# Patient Record
Sex: MALE | Race: WHITE | NOT HISPANIC OR LATINO | Employment: OTHER | URBAN - METROPOLITAN AREA
[De-identification: names, ages, dates, MRNs, and addresses within clinical notes are randomized per-mention and may not be internally consistent; named-entity substitution may affect disease eponyms.]

---

## 2018-01-11 ENCOUNTER — TELEPHONE (OUTPATIENT)
Dept: OTHER | Facility: CLINIC | Age: 68
End: 2018-01-11

## 2018-01-11 NOTE — LETTER
[facility]  [address] [place] [address] [address]  [place] [address]  Phone: [phone]  Fax: [phone]      January 11, 2018      Sulaiman Marroquin  [address] [facility] [address]          Dear Sulaiman Marroquin,    It is our policy after two unacknowledged letters that we dismiss you from our service.  Our records indicate that you were sent letters 9/28/17 and 10/31/17.  Please let us know if you have found another provider so that we can forward your records.    Sincerely,      Jos Patel MD    [facility]  [address]  [facility] [address]  Phone: [phone]  Fax: [phone]

## 2018-01-11 NOTE — TELEPHONE ENCOUNTER
Patient had not responded to two letters sent for follow up  Dismissal letter sent  Malaika Bess RN, BSN

## 2018-11-02 ENCOUNTER — OFFICE VISIT (OUTPATIENT)
Dept: FAMILY MEDICINE | Facility: CLINIC | Age: 68
End: 2018-11-02
Payer: COMMERCIAL

## 2018-11-02 VITALS
BODY MASS INDEX: 27.3 KG/M2 | RESPIRATION RATE: 14 BRPM | HEART RATE: 66 BPM | WEIGHT: 206 LBS | TEMPERATURE: 98.1 F | DIASTOLIC BLOOD PRESSURE: 80 MMHG | HEIGHT: 73 IN | SYSTOLIC BLOOD PRESSURE: 132 MMHG | OXYGEN SATURATION: 97 %

## 2018-11-02 DIAGNOSIS — J45.20 MILD INTERMITTENT ASTHMA WITHOUT COMPLICATION: ICD-10-CM

## 2018-11-02 DIAGNOSIS — Z23 NEED FOR PROPHYLACTIC VACCINATION WITH TETANUS-DIPHTHERIA (TD): ICD-10-CM

## 2018-11-02 DIAGNOSIS — Z23 NEED FOR PROPHYLACTIC VACCINATION AGAINST STREPTOCOCCUS PNEUMONIAE (PNEUMOCOCCUS): ICD-10-CM

## 2018-11-02 DIAGNOSIS — Z11.59 NEED FOR HEPATITIS C SCREENING TEST: ICD-10-CM

## 2018-11-02 DIAGNOSIS — Z00.00 INITIAL MEDICARE ANNUAL WELLNESS VISIT: Primary | ICD-10-CM

## 2018-11-02 DIAGNOSIS — Z12.11 SCREEN FOR COLON CANCER: ICD-10-CM

## 2018-11-02 DIAGNOSIS — Z12.5 SCREENING FOR PROSTATE CANCER: ICD-10-CM

## 2018-11-02 DIAGNOSIS — Z23 NEED FOR PROPHYLACTIC VACCINATION AND INOCULATION AGAINST INFLUENZA: ICD-10-CM

## 2018-11-02 DIAGNOSIS — Z13.220 LIPID SCREENING: ICD-10-CM

## 2018-11-02 PROCEDURE — G0008 ADMIN INFLUENZA VIRUS VAC: HCPCS | Performed by: FAMILY MEDICINE

## 2018-11-02 PROCEDURE — 90662 IIV NO PRSV INCREASED AG IM: CPT | Performed by: FAMILY MEDICINE

## 2018-11-02 PROCEDURE — 90670 PCV13 VACCINE IM: CPT | Performed by: FAMILY MEDICINE

## 2018-11-02 PROCEDURE — G0438 PPPS, INITIAL VISIT: HCPCS | Mod: 25 | Performed by: FAMILY MEDICINE

## 2018-11-02 PROCEDURE — 90472 IMMUNIZATION ADMIN EACH ADD: CPT | Performed by: FAMILY MEDICINE

## 2018-11-02 PROCEDURE — G0009 ADMIN PNEUMOCOCCAL VACCINE: HCPCS | Mod: 59 | Performed by: FAMILY MEDICINE

## 2018-11-02 PROCEDURE — 90715 TDAP VACCINE 7 YRS/> IM: CPT | Performed by: FAMILY MEDICINE

## 2018-11-02 RX ORDER — ALBUTEROL SULFATE 90 UG/1
2 AEROSOL, METERED RESPIRATORY (INHALATION) EVERY 6 HOURS PRN
Qty: 1 INHALER | Refills: 0 | Status: SHIPPED | OUTPATIENT
Start: 2018-11-02 | End: 2020-07-21

## 2018-11-02 RX ORDER — CETIRIZINE HYDROCHLORIDE 10 MG/1
10 TABLET ORAL DAILY PRN
COMMUNITY
End: 2019-01-31

## 2018-11-02 ASSESSMENT — ENCOUNTER SYMPTOMS
PALPITATIONS: 0
DIZZINESS: 0
DYSURIA: 0
ENDOCRINE NEGATIVE: 1
HEADACHES: 0
HEMATOCHEZIA: 0
WHEEZING: 1
CONSTIPATION: 0
ARTHRALGIAS: 0
JOINT SWELLING: 0
COUGH: 1
ABDOMINAL PAIN: 0
HEARTBURN: 0
FEVER: 0
NAUSEA: 0
CHILLS: 0
SORE THROAT: 0
MYALGIAS: 1
PARESTHESIAS: 0
HEMATOLOGIC/LYMPHATIC NEGATIVE: 1
NERVOUS/ANXIOUS: 1
EYE PAIN: 0
SHORTNESS OF BREATH: 1
HEMATURIA: 0
FREQUENCY: 0
DIARRHEA: 0
WEAKNESS: 0

## 2018-11-02 ASSESSMENT — ACTIVITIES OF DAILY LIVING (ADL): CURRENT_FUNCTION: NO ASSISTANCE NEEDED

## 2018-11-02 NOTE — MR AVS SNAPSHOT
After Visit Summary   11/2/2018    Sulaiman Marroquin    MRN: [id number]           Patient Information     Date Of Birth          1950        Visit Information        Provider Department      11/2/2018 9:00 AM Hayden Rosario MD [facility]        Today's Diagnoses     Mild intermittent asthma without complication    -  1    Screen for colon cancer        Need for hepatitis C screening test        Need for prophylactic vaccination and inoculation against influenza        Need for prophylactic vaccination against Streptococcus pneumoniae (pneumococcus)        Need for prophylactic vaccination with tetanus-diphtheria (Td)        Lipid screening        Screening for prostate cancer          Care Instructions      Preventive Health Recommendations:     See your health care provider every year to    Review health changes.     Discuss preventive care.      Review your medicines if your doctor has prescribed any.      Talk with your health care provider about whether you should have a test to screen for prostate cancer (PSA).    Every 3 years, have a diabetes test (fasting glucose). If you are at risk for diabetes, you should have this test more often.    Every 5 years, have a cholesterol test. Have this test more often if you are at risk for high cholesterol or heart disease.     Every 10 years, have a colonoscopy. Or, have a yearly FIT test (stool test). These exams will check for colon cancer.    Talk to with your health care provider about screening for Abdominal Aortic Aneurysm if you have a family history of AAA or have a history of smoking.    Shots:     Get a flu shot each year.     Get a tetanus shot every 10 years.     Talk to your doctor about your pneumonia vaccines. There are now two you should receive - Pneumovax (PPSV 23) and Prevnar (PCV 13).     Talk to your pharmacist about a shingles vaccine.     Talk to your doctor about the hepatitis B  vaccine.  Nutrition:     Eat at least 5 servings of fruits and vegetables each day.     Eat whole-grain bread, whole-wheat pasta and brown rice instead of white grains and rice.     Get adequate Calcium and Vitamin D.   Lifestyle    Exercise for at least 150 minutes a week (30 minutes a day, 5 days a week). This will help you control your weight and prevent disease.     Limit alcohol to one drink per day.     No smoking.     Wear sunscreen to prevent skin cancer.    See your dentist every six months for an exam and cleaning.    See your eye doctor every 1 to 2 years to screen for conditions such as glaucoma, macular degeneration, cataracts, etc.    Personalized Prevention Plan  You are due for the preventive services outlined below.  Your care team is available to assist you in scheduling these services.  If you have already completed any of these items, please share that information with your care team to update in your medical record.  Health Maintenance Due   Topic Date Due     Tetanus Vaccine - every 10 years  01/22/1968     Hepatitis C Screening  01/22/1968     Cholesterol Lab - every 5 years  01/22/1985     Colon Cancer Screening - every 10 years.  01/22/2000     Pneumococcal Vaccine (1 of 2 - PCV13) 01/22/2015     Flu Vaccine (1) 09/01/2018     I recommend shingrix vaccine - medicare requires this be given at the pharmacy.    The goals you identified this year include walking more regularly and cutting back on sugars, perhaps also working towards the mediterranean diet. Specifically introducing more fresh fruits.    Clotrimazole to feet daily for athlete's foot.          Follow-ups after your visit        Future tests that were ordered for you today     Open Future Orders        Priority Expected Expires Ordered    Hepatitis C Screen Reflex to HCV RNA Quant and Genotype Routine  11/2/2019 11/2/2018    Lipid panel reflex to direct LDL Fasting Routine  11/2/2019 11/2/2018    PSA, screen Routine  11/2/2019  "11/2/2018            Who to contact     If you have questions or need follow up information about today's clinic visit or your schedule please contact [facility] directly at [phone].  Normal or non-critical lab and imaging results will be communicated to you by MyChart, letter or phone within 4 business days after the clinic has received the results. If you do not hear from us within 7 days, please contact the clinic through MyChart or phone. If you have a critical or abnormal lab result, we will notify you by phone as soon as possible.  Submit refill requests through Fios or call your pharmacy and they will forward the refill request to us. Please allow 3 business days for your refill to be completed.          Additional Information About Your Visit        [employer] Information     Fios gives you secure access to your electronic health record. If you see a primary care provider, you can also send messages to your care team and make appointments. If you have questions, please call your primary care clinic.  If you do not have a primary care provider, please call [phone] and they will assist you.        Care EveryWhere ID     This is your Care EveryWhere ID. This could be used by other organizations to access your [place] medical records  [id number]        Your Vitals Were     Pulse Temperature Respirations Height Pulse Oximetry BMI (Body Mass Index)    66 98.1  F (36.7  C) (Oral) 14 6' 0.5\" (1.842 m) 97% 27.55 kg/m2       Blood Pressure from Last 3 Encounters:   11/02/18 132/80   11/04/16 118/64   12/30/15 128/78    Weight from Last 3 Encounters:   11/02/18 206 lb (93.4 kg)   11/04/16 215 lb 9.6 oz (97.8 kg)   12/30/15 210 lb (95.3 kg)              We Performed the Following     FLU VACCINE, INCREASED ANTIGEN, PRESV FREE, AGE 65+ [71197]     PNEUMOCOCCAL CONJ VACCINE 13 VALENT IM     TDAP, IM (10 - 64 YRS) - Adacel     Vaccine Administration, Each Additional [40557]     Vaccine " Administration, Initial [54052]          Today's Medication Changes          These changes are accurate as of 11/2/18 11:15 AM.  If you have any questions, ask your nurse or doctor.               Start taking these medicines.        Dose/Directions    albuterol 108 (90 Base) MCG/ACT inhaler   Commonly known as:  PROAIR HFA/PROVENTIL HFA/VENTOLIN HFA   Used for:  Mild intermittent asthma without complication   Started by:  Hayden Rosario MD        Dose:  2 puff   Inhale 2 puffs into the lungs every 6 hours as needed for shortness of breath / dyspnea or wheezing   Quantity:  1 Inhaler   Refills:  0            Where to get your medicines      These medications were sent to [facility] [address] [place] [address] AT [address] [address]     Phone:  [phone]     albuterol 108 (90 Base) MCG/ACT inhaler                Primary Care Provider Fax #    Provider Not In System [phone]                Equal Access to Services     [facility]: Hadii velia ku hadasho Soomaali, waaxda luqadaha, qaybta kaalmada adeegyada, waxsaida hanin haytena ramirez . So [facility] [phone].    ATENCIÓN: Si habla español, tiene a joe disposición servicios gratuitos de asistencia lingüística. Llame al [phone].    We comply with applicable federal civil rights laws and [place] laws. We do not discriminate on the basis of race, color, national origin, age, disability, sex, sexual orientation, or gender identity.            Thank you!     Thank you for choosing [facility]  for your care. Our goal is always to provide you with excellent care. Hearing back from our patients is one way we can continue to improve our services. Please take a few minutes to complete the written survey that you may receive in the mail after your visit with us. Thank you!             Your Updated Medication List - Protect others around you:  Learn how to safely use, store and throw away your medicines at www.disposemymeds.org.          This list is accurate as of 11/2/18 11:15 AM.  Always use your most recent med list.                   Brand Name Dispense Instructions for use Diagnosis    albuterol 108 (90 Base) MCG/ACT inhaler    PROAIR HFA/PROVENTIL HFA/VENTOLIN HFA    1 Inhaler    Inhale 2 puffs into the lungs every 6 hours as needed for shortness of breath / dyspnea or wheezing    Mild intermittent asthma without complication       CALCIUM 500 PO      Take 1,000 mg by mouth daily        CENTRUM SILVER 50+MEN PO      Take by mouth daily        cetirizine 10 MG tablet    zyrTEC     Take 10 mg by mouth daily as needed for allergies        MELATONIN PO      Take 3 mg by mouth nightly as needed        PROBIOTIC ACIDOPHILUS PO           TURMERIC PO      Take 1,330 mg by mouth daily        URINOZINC PO      Take by mouth daily        VITAMIN C PO      Take 600 mg by mouth daily

## 2018-11-02 NOTE — PATIENT INSTRUCTIONS
Preventive Health Recommendations:     See your health care provider every year to    Review health changes.     Discuss preventive care.      Review your medicines if your doctor has prescribed any.      Talk with your health care provider about whether you should have a test to screen for prostate cancer (PSA).    Every 3 years, have a diabetes test (fasting glucose). If you are at risk for diabetes, you should have this test more often.    Every 5 years, have a cholesterol test. Have this test more often if you are at risk for high cholesterol or heart disease.     Every 10 years, have a colonoscopy. Or, have a yearly FIT test (stool test). These exams will check for colon cancer.    Talk to with your health care provider about screening for Abdominal Aortic Aneurysm if you have a family history of AAA or have a history of smoking.    Shots:     Get a flu shot each year.     Get a tetanus shot every 10 years.     Talk to your doctor about your pneumonia vaccines. There are now two you should receive - Pneumovax (PPSV 23) and Prevnar (PCV 13).     Talk to your pharmacist about a shingles vaccine.     Talk to your doctor about the hepatitis B vaccine.  Nutrition:     Eat at least 5 servings of fruits and vegetables each day.     Eat whole-grain bread, whole-wheat pasta and brown rice instead of white grains and rice.     Get adequate Calcium and Vitamin D.   Lifestyle    Exercise for at least 150 minutes a week (30 minutes a day, 5 days a week). This will help you control your weight and prevent disease.     Limit alcohol to one drink per day.     No smoking.     Wear sunscreen to prevent skin cancer.    See your dentist every six months for an exam and cleaning.    See your eye doctor every 1 to 2 years to screen for conditions such as glaucoma, macular degeneration, cataracts, etc.    Personalized Prevention Plan  You are due for the preventive services outlined below.  Your care team is available to assist you  in scheduling these services.  If you have already completed any of these items, please share that information with your care team to update in your medical record.  Health Maintenance Due   Topic Date Due     Tetanus Vaccine - every 10 years  01/22/1968     Hepatitis C Screening  01/22/1968     Cholesterol Lab - every 5 years  01/22/1985     Colon Cancer Screening - every 10 years.  01/22/2000     Pneumococcal Vaccine (1 of 2 - PCV13) 01/22/2015     Flu Vaccine (1) 09/01/2018     I recommend shingrix vaccine - medicare requires this be given at the pharmacy.    The goals you identified this year include walking more regularly and cutting back on sugars, perhaps also working towards the mediterranean diet. Specifically introducing more fresh fruits.    Clotrimazole to feet daily for athlete's foot.

## 2018-11-02 NOTE — PROGRESS NOTES
"SUBJECTIVE:   Sulaiman Marroquin is a 68 year old male who presents for Preventive Visit.    Are you in the first 12 months of your Medicare coverage?  No    Annual Wellness Visit     In general, how would you rate your overall health?  Excellent    Frequency of exercise:  None    Do you usually eat at least 4 servings of fruit and vegetables a day, include whole grains    & fiber and avoid regularly eating high fat or \"junk\" foods?  Yes    Taking medications regularly:  Yes (Would like to receive inhaler)    Medication side effects:  Not applicable    Ability to successfully perform activities of daily living:  No assistance needed    Home Safety:  Throw rugs in the hallway and lack of grab bars in the bathroom    Hearing Impairment:  No hearing concerns    In the past 6 months, have you been bothered by leaking of urine?  No    In general, how would you rate your overall mental or emotional health?  Good    PHQ-2 Total Score: 0    Additional concerns today:  Yes    Updated on aneurysm history. Patient would like rescue inhaler - hasn't needed it.    Physical Activity: would be good to be more active, perhaps walking more regularly  Nutrition: trying to cut back or eliminate sugar.     Fall risk:  Fallen 2 or more times in the past year?: No  Any fall with injury in the past year?: No    COGNITIVE SCREEN  1) Repeat 3 items (Leader, Season, Table)    2) Clock draw: NORMAL  3) 3 item recall: Recalls 3 objects  Results: 3 items recalled: COGNITIVE IMPAIRMENT LESS LIKELY    Mini-CogTM Copyright S Nora. Licensed by the author for use in [facility]; reprinted with permission (nelson@.[facility]). All rights reserved.        Reviewed and updated as needed this visit by clinical staff  Tobacco  Allergies  Meds  Problems  Med Hx  Surg Hx  Fam Hx  Soc Hx          Reviewed and updated as needed this visit by Provider  Allergies  Meds  Problems        Social History   Substance Use Topics     Smoking " status: Never Smoker     Smokeless tobacco: Never Used     Alcohol use No      Comment: Recovery        Alcohol Use 11/2/2018   If you drink alcohol do you typically have greater than 3 drinks per day OR greater than 7 drinks per week? Not Applicable       Do you feel safe in your environment - Yes    Do you have a Health Care Directive?: No: Advance care planning was reviewed with patient; patient declined at this time.    Current providers sharing in care for this patient include:   Patient Care Team:  System, Provider Not In as PCP - General (Clinic)    The following health maintenance items are reviewed in Epic and correct as of today:  Health Maintenance   Topic Date Due     ASTHMA ACTION PLAN Q1 YR  01/22/1955     ASTHMA CONTROL TEST Q6 MOS  01/22/1955     TETANUS IMMUNIZATION (SYSTEM ASSIGNED)  01/22/1968     HEPATITIS C SCREENING  01/22/1968     LIPID SCREEN Q5 YR MALE (SYSTEM ASSIGNED)  01/22/1985     COLON CANCER SCREEN (SYSTEM ASSIGNED)  01/22/2000     PNEUMOCOCCAL (1 of 2 - PCV13) 01/22/2015     INFLUENZA VACCINE (1) 09/01/2018     FALL RISK ASSESSMENT  11/02/2019     PHQ-2 Q1 YR  11/02/2019     ADVANCE DIRECTIVE PLANNING Q5 YRS  11/02/2023     AORTIC ANEURYSM SCREENING (SYSTEM ASSIGNED)  Completed       Review of Systems   Constitutional: Negative for chills and fever.   HENT: Positive for congestion. Negative for ear pain, hearing loss and sore throat.    Eyes: Negative for pain and visual disturbance.   Respiratory: Positive for cough, shortness of breath and wheezing.         Asthma   Cardiovascular: Negative for chest pain, palpitations and peripheral edema.   Gastrointestinal: Negative for abdominal pain, constipation, diarrhea, heartburn, hematochezia and nausea.   Endocrine: Negative.    Genitourinary: Negative for discharge, dysuria, frequency, genital sores, hematuria, impotence and urgency.   Musculoskeletal: Positive for myalgias. Negative for arthralgias and joint swelling.   Skin: Negative  "for rash.   Allergic/Immunologic: Positive for environmental allergies.   Neurological: Negative for dizziness, weakness, headaches and paresthesias.   Hematological: Negative.    Psychiatric/Behavioral: Negative for mood changes. The patient is nervous/anxious.      Muscle aches relate to lower back pain - sees chiropractor    Does have stress -  had a stroke about a year ago. Has some expressive aphasia. Does see a counselor.     OBJECTIVE:   /80 (BP Location: Left arm, Patient Position: Sitting, Cuff Size: Adult Regular)  Pulse 66  Temp 98.1  F (36.7  C) (Oral)  Resp 14  Ht 6' 0.5\" (1.842 m)  Wt 206 lb (93.4 kg)  SpO2 97%  BMI 27.55 kg/m2 Estimated body mass index is 27.55 kg/(m^2) as calculated from the following:    Height as of this encounter: 6' 0.5\" (1.842 m).    Weight as of this encounter: 206 lb (93.4 kg).  Physical Exam  GENERAL: healthy, alert and no distress  EYES: Eyes grossly normal to inspection, PERRL and conjunctivae and sclerae normal  HENT: ear canals and TM's normal, nose and mouth without ulcers or lesions  NECK: no adenopathy, no asymmetry, masses, or scars and thyroid normal to palpation  RESP: lungs clear to auscultation - no rales, rhonchi or wheezes  CV: regular rate and rhythm, normal S1 S2, no S3 or S4, no murmur, click or rub, no peripheral edema and peripheral pulses strong  ABDOMEN: soft, nontender, no hepatosplenomegaly, no masses and bowel sounds normal  MS: no gross musculoskeletal defects noted, no edema  SKIN: no suspicious lesions or rashes  NEURO: Normal strength and tone, mentation intact and speech normal  PSYCH: mentation appears normal, affect normal/bright      ASSESSMENT / PLAN:   Sulaiman was seen today for physical.    Diagnoses and all orders for this visit:    Initial Medicare annual wellness visit    Screen for colon cancer    Need for hepatitis C screening test  -     Hepatitis C Screen Reflex to HCV RNA Quant and Genotype; Future    Need for " "prophylactic vaccination and inoculation against influenza  -     FLU VACCINE, INCREASED ANTIGEN, PRESV FREE, AGE 65+ [69638]  -     Vaccine Administration, Initial [52560]    Need for prophylactic vaccination against Streptococcus pneumoniae (pneumococcus)  -     PNEUMOCOCCAL CONJ VACCINE 13 VALENT IM  -     Vaccine Administration, Each Additional [59407]    Need for prophylactic vaccination with tetanus-diphtheria (Td)  -     TDAP, IM (10 - 64 YRS) - Adacel  -     Vaccine Administration, Each Additional [15977]    Mild intermittent asthma without complication  -     albuterol (PROAIR HFA/PROVENTIL HFA/VENTOLIN HFA) 108 (90 Base) MCG/ACT inhaler; Inhale 2 puffs into the lungs every 6 hours as needed for shortness of breath / dyspnea or wheezing    Lipid screening  -     Lipid panel reflex to direct LDL Fasting; Future    Screening for prostate cancer  -     PSA, screen; Future    Other orders  -     Cancel: GLUCOSE        End of Life Planning:  Patient currently has an advanced directive: to be discussed at upcoming visit    COUNSELING:  Reviewed preventive health counseling, as reflected in patient instructions       Regular exercise       Healthy diet/nutrition    BP Readings from Last 1 Encounters:   11/02/18 132/80     Estimated body mass index is 27.55 kg/(m^2) as calculated from the following:    Height as of this encounter: 6' 0.5\" (1.842 m).    Weight as of this encounter: 206 lb (93.4 kg).    I recommend shingrix vaccine - medicare requires this be given at the pharmacy.    The goals you identified this year include walking more regularly and cutting back on sugars, perhaps also working towards the mediterranean diet. Specifically introducing more fresh fruits.    Clotrimazole to feet daily for athlete's foot.       reports that he has never smoked. He has never used smokeless tobacco.      Appropriate preventive services were discussed with this patient, including applicable screening as appropriate for " cardiovascular disease, diabetes, osteopenia/osteoporosis, and glaucoma.  As appropriate for age/gender, discussed screening for colorectal cancer, prostate cancer, breast cancer, and cervical cancer. Checklist reviewing preventive services available has been given to the patient.    Reviewed patients plan of care and provided an AVS. The Intermediate Care Plan ( asthma action plan, low back pain action plan, and migraine action plan) for Sulaiman meets the Care Plan requirement. This Care Plan has been established and reviewed with the Patient.    Counseling Resources:  ATP IV Guidelines  Pooled Cohorts Equation Calculator  Breast Cancer Risk Calculator  FRAX Risk Assessment  ICSI Preventive Guidelines  Dietary Guidelines for Americans, 2010  USDA's MyPlate  ASA Prophylaxis  Lung CA Screening    Hayden Maloney MD  [facility]

## 2018-11-02 NOTE — NURSING NOTE
Prior to injection verified patient identity using patient's name and date of birth.  Due to injection administration, patient instructed to remain in clinic for 15 minutes  afterwards, and to report any adverse reaction to me immediately. Vaccine information supplied.      Injectable Influenza Immunization Documentation    1.  Is the person to be vaccinated sick today?   No    2. Does the person to be vaccinated have an allergy to a component   of the vaccine?   No  Egg Allergy Algorithm Link    3. Has the person to be vaccinated ever had a serious reaction   to influenza vaccine in the past?   No    4. Has the person to be vaccinated ever had Guillain-Barré syndrome?   No    Form completed by [facility]    Screening Questionnaire for Adult Immunization    Are you sick today?   No   Do you have allergies to medications, food, a vaccine component or latex?   Yes   Have you ever had a serious reaction after receiving a vaccination?   No   Do you have a long-term health problem with heart disease, lung disease, asthma, kidney disease, metabolic disease (e.g. diabetes), anemia, or other blood disorder?   No   Do you have cancer, leukemia, HIV/AIDS, or any other immune system problem?   No   In the past 3 months, have you taken medications that affect  your immune system, such as prednisone, other steroids, or anticancer drugs; drugs for the treatment of rheumatoid arthritis, Crohn s disease, or psoriasis; or have you had radiation treatments?   No   Have you had a seizure, or a brain or other nervous system problem?   No   During the past year, have you received a transfusion of blood or blood     products, or been given immune (gamma) globulin or antiviral drug?   No   For women: Are you pregnant or is there a chance you could become        pregnant during the next month?   No   Have you received any vaccinations in the past 4 weeks?   No     Immunization questionnaire was positive for at least one answer.  Notified  Berhane Maloney.        Per orders of Dr. Berhane Aparicio, injection of Adacel and PCV13 given by Mauro Vicente. Patient instructed to remain in clinic for 15 minutes afterwards, and to report any adverse reaction to me immediately.       Screening performed by Mauro Vicente on 11/2/2018 at 11:14 AM.

## 2018-11-03 ASSESSMENT — ASTHMA QUESTIONNAIRES: ACT_TOTALSCORE: 16

## 2019-01-31 ENCOUNTER — OFFICE VISIT (OUTPATIENT)
Dept: FAMILY MEDICINE | Facility: CLINIC | Age: 69
End: 2019-01-31
Payer: COMMERCIAL

## 2019-01-31 VITALS
HEART RATE: 70 BPM | SYSTOLIC BLOOD PRESSURE: 149 MMHG | HEIGHT: 73 IN | OXYGEN SATURATION: 96 % | WEIGHT: 213 LBS | RESPIRATION RATE: 20 BRPM | DIASTOLIC BLOOD PRESSURE: 78 MMHG | BODY MASS INDEX: 28.23 KG/M2 | TEMPERATURE: 98 F

## 2019-01-31 DIAGNOSIS — Z12.5 SCREENING FOR PROSTATE CANCER: ICD-10-CM

## 2019-01-31 DIAGNOSIS — Z11.59 NEED FOR HEPATITIS C SCREENING TEST: ICD-10-CM

## 2019-01-31 DIAGNOSIS — Z13.220 LIPID SCREENING: ICD-10-CM

## 2019-01-31 DIAGNOSIS — M25.561 ACUTE PAIN OF RIGHT KNEE: Primary | ICD-10-CM

## 2019-01-31 LAB
ANION GAP SERPL CALCULATED.3IONS-SCNC: 4 MMOL/L (ref 3–14)
BASOPHILS # BLD AUTO: 0 10E9/L (ref 0–0.2)
BASOPHILS NFR BLD AUTO: 0.3 %
BUN SERPL-MCNC: 16 MG/DL (ref 7–30)
CALCIUM SERPL-MCNC: 9.3 MG/DL (ref 8.5–10.1)
CHLORIDE SERPL-SCNC: 103 MMOL/L (ref 94–109)
CHOLEST SERPL-MCNC: 181 MG/DL
CO2 SERPL-SCNC: 29 MMOL/L (ref 20–32)
CREAT SERPL-MCNC: 1.05 MG/DL (ref 0.66–1.25)
DIFFERENTIAL METHOD BLD: ABNORMAL
EOSINOPHIL # BLD AUTO: 0.4 10E9/L (ref 0–0.7)
EOSINOPHIL NFR BLD AUTO: 5.7 %
ERYTHROCYTE [DISTWIDTH] IN BLOOD BY AUTOMATED COUNT: 11.3 % (ref 10–15)
GFR SERPL CREATININE-BSD FRML MDRD: 72 ML/MIN/{1.73_M2}
GLUCOSE SERPL-MCNC: 90 MG/DL (ref 70–99)
HCT VFR BLD AUTO: 42.9 % (ref 40–53)
HDLC SERPL-MCNC: 40 MG/DL
HGB BLD-MCNC: 14.8 G/DL (ref 13.3–17.7)
LDLC SERPL CALC-MCNC: 101 MG/DL
LYMPHOCYTES # BLD AUTO: 2.4 10E9/L (ref 0.8–5.3)
LYMPHOCYTES NFR BLD AUTO: 37.6 %
MCH RBC QN AUTO: 33.6 PG (ref 26.5–33)
MCHC RBC AUTO-ENTMCNC: 34.5 G/DL (ref 31.5–36.5)
MCV RBC AUTO: 98 FL (ref 78–100)
MONOCYTES # BLD AUTO: 0.8 10E9/L (ref 0–1.3)
MONOCYTES NFR BLD AUTO: 12.7 %
NEUTROPHILS # BLD AUTO: 2.8 10E9/L (ref 1.6–8.3)
NEUTROPHILS NFR BLD AUTO: 43.7 %
NONHDLC SERPL-MCNC: 141 MG/DL
PLATELET # BLD AUTO: 211 10E9/L (ref 150–450)
POTASSIUM SERPL-SCNC: 4.1 MMOL/L (ref 3.4–5.3)
PSA SERPL-ACNC: 0.31 UG/L (ref 0–4)
RBC # BLD AUTO: 4.4 10E12/L (ref 4.4–5.9)
SODIUM SERPL-SCNC: 136 MMOL/L (ref 133–144)
TRIGL SERPL-MCNC: 198 MG/DL
URATE SERPL-MCNC: 5.8 MG/DL (ref 3.5–7.2)
WBC # BLD AUTO: 6.3 10E9/L (ref 4–11)

## 2019-01-31 PROCEDURE — 36415 COLL VENOUS BLD VENIPUNCTURE: CPT | Performed by: FAMILY MEDICINE

## 2019-01-31 PROCEDURE — 86803 HEPATITIS C AB TEST: CPT | Performed by: FAMILY MEDICINE

## 2019-01-31 PROCEDURE — 84550 ASSAY OF BLOOD/URIC ACID: CPT | Performed by: FAMILY MEDICINE

## 2019-01-31 PROCEDURE — G0472 HEP C SCREEN HIGH RISK/OTHER: HCPCS | Performed by: FAMILY MEDICINE

## 2019-01-31 PROCEDURE — 80061 LIPID PANEL: CPT | Performed by: FAMILY MEDICINE

## 2019-01-31 PROCEDURE — G0103 PSA SCREENING: HCPCS | Performed by: FAMILY MEDICINE

## 2019-01-31 PROCEDURE — 85025 COMPLETE CBC W/AUTO DIFF WBC: CPT | Performed by: FAMILY MEDICINE

## 2019-01-31 PROCEDURE — 80048 BASIC METABOLIC PNL TOTAL CA: CPT | Performed by: FAMILY MEDICINE

## 2019-01-31 PROCEDURE — 99214 OFFICE O/P EST MOD 30 MIN: CPT | Performed by: FAMILY MEDICINE

## 2019-01-31 RX ORDER — CYANOCOBALAMIN (VITAMIN B-12) 500 MCG
400 LOZENGE ORAL DAILY
COMMUNITY

## 2019-01-31 RX ORDER — INDOMETHACIN 25 MG/1
25 CAPSULE ORAL 2 TIMES DAILY WITH MEALS
Qty: 180 CAPSULE | Refills: 3 | Status: SHIPPED | OUTPATIENT
Start: 2019-01-31 | End: 2019-02-08

## 2019-01-31 ASSESSMENT — MIFFLIN-ST. JEOR: SCORE: 1777.1

## 2019-01-31 NOTE — PROGRESS NOTES
"  SUBJECTIVE:   Sulaiman Marroquin is a 69 year old male who presents to clinic today for the following health issues:      Musculoskeletal problem/pain      Duration: two weeks     Description  Location:  Right knee    Intensity:  moderate    Accompanying signs and symptoms: swelling    History  Previous similar problem: YES- injured in middle school   Previous evaluation:  Chiropractor     Precipitating or alleviating factors:  Trauma or overuse: unknown   Aggravating factors include: standing and climbing stairs    Therapies tried and outcome: ice tylenol and chiropractor      No recollection of an injury. He doesn't remember the exact moment but it twisted slightly when he was getting up or getting down.    Pain is 1/10 at present.  Two nights ago it was 4-5/10 and caused difficulty sleeping.    No alcohol consumption.  No change in protein consumption.    ROS  No fever  Mild cough  No rash    EXAM:  Pulse 70   Temp 98  F (36.7  C)   Resp 20   Ht 1.842 m (6' 0.5\")   Wt 96.6 kg (213 lb)   SpO2 96%   BMI 28.49 kg/m    Constitutional: Healthy, alert, no distress   EENT: Mole night in sclera of right eye, PERRL, TM's clear bilaterally, oropharynx without erythema, no anterior cervical lymphadenopathy   Cardiovascular: RRR. No murmurs   Respiratory: Clear to auscultation   Musculoskeletal: swelling and discomfort with flexion of right knee. No erythema or warmth appreciated.    ASSESSMENT    ICD-10-CM    1. Acute pain of right knee M25.561 Uric acid     Basic metabolic panel  (Ca, Cl, CO2, Creat, Gluc, K, Na, BUN)     indomethacin (INDOCIN) 25 MG capsule     CBC with platelets and differential      Plan:  Patient Instructions   Consider a routine eye exam.    I recommend a humidifier in the bedroom 30-40% humidity, change filter monthly.  Cold air humidifier not vaporizer.     I recommend indomethacin 25mg by mouth three times a day for up to 5 days.    Could consider knee brace, PT, knee xray and ortho " consult if not improving with treatment or labs not supportive of gout.    Working diagnosis is gout, though differential could include infectious or traumatic injury.       Return in about 4 weeks (around 2/28/2019) for Routine Visit.    Hayden Maloney MD  Family Medicine Physician

## 2019-01-31 NOTE — PATIENT INSTRUCTIONS
Consider a routine eye exam.    I recommend a humidifier in the bedroom 30-40% humidity, change filter monthly.  Cold air humidifier not vaporizer.     I recommend indomethacin 25mg by mouth three times a day for up to 5 days.    Could consider knee brace, PT, knee xray and ortho consult if not improving with treatment or labs not supportive of gout.    Working diagnosis is gout, though differential could include infectious or traumatic injury.

## 2019-02-01 LAB — HCV AB SERPL QL IA: NONREACTIVE

## 2019-02-04 ENCOUNTER — MYC MEDICAL ADVICE (OUTPATIENT)
Dept: FAMILY MEDICINE | Facility: CLINIC | Age: 69
End: 2019-02-04

## 2019-02-04 DIAGNOSIS — M25.561 RIGHT KNEE PAIN: Primary | ICD-10-CM

## 2019-02-04 NOTE — TELEPHONE ENCOUNTER
Mino Maloney MD     Patient sent another message requesting order for hip xray as well as the knee xray requested below     Please advise     Thank you,   Nesha Burger, Registered Nurse   [facility]

## 2019-02-04 NOTE — TELEPHONE ENCOUNTER
Dr Berhane Maloney,    THe referral to ortho was initiated for pt as you had indicated in your note that was the next step.     Can you order the knee xray if you think he should have it before his ortho visit?    Thanks,    Denisse Ruiz, RN, BSN

## 2019-02-06 NOTE — TELEPHONE ENCOUNTER
My recommendation is for him to have xrays done at a visit with me (or another provider).    I can order them at that time.    Hayden Maloney MD  Family Medicine Physician

## 2019-02-08 ENCOUNTER — OFFICE VISIT (OUTPATIENT)
Dept: FAMILY MEDICINE | Facility: CLINIC | Age: 69
End: 2019-02-08
Payer: COMMERCIAL

## 2019-02-08 VITALS
DIASTOLIC BLOOD PRESSURE: 88 MMHG | HEART RATE: 70 BPM | TEMPERATURE: 97.9 F | RESPIRATION RATE: 14 BRPM | SYSTOLIC BLOOD PRESSURE: 138 MMHG | WEIGHT: 212 LBS | BODY MASS INDEX: 28.36 KG/M2 | OXYGEN SATURATION: 98 %

## 2019-02-08 DIAGNOSIS — M25.561 ACUTE PAIN OF RIGHT KNEE: ICD-10-CM

## 2019-02-08 DIAGNOSIS — Z91.89 FRAMINGHAM CARDIAC RISK >20% IN NEXT 10 YEARS: ICD-10-CM

## 2019-02-08 DIAGNOSIS — M10.061 ACUTE IDIOPATHIC GOUT OF RIGHT KNEE: Primary | ICD-10-CM

## 2019-02-08 PROCEDURE — 99214 OFFICE O/P EST MOD 30 MIN: CPT | Performed by: FAMILY MEDICINE

## 2019-02-08 RX ORDER — INDOMETHACIN 25 MG/1
25 CAPSULE ORAL
Qty: 45 CAPSULE | Refills: 1 | Status: SHIPPED | OUTPATIENT
Start: 2019-02-08 | End: 2019-12-12

## 2019-02-08 RX ORDER — ATORVASTATIN CALCIUM 40 MG/1
40 TABLET, FILM COATED ORAL DAILY
Qty: 90 TABLET | Refills: 3 | Status: SHIPPED | OUTPATIENT
Start: 2019-02-08 | End: 2020-01-14

## 2019-02-08 NOTE — PROGRESS NOTES
SUBJECTIVE:   Sulaiman Marroquin is a 69 year old male who presents to clinic today for the following health issues:      Right knee follow up      Other: Patient stated stiffness, minor swelling and unreliability. Overall patient has seen minor relief.      Indomethacin did help. Uric acid level was normal.    He took this for 5 days starting a week ago - initially he took only two a day, when he increased to three times a day, it worked better.    He began to wonder about pain in his right hip since our last visit. He is having benefit from work with chiropractor.    The 10-year ASCVD risk score (Retsofward BONILLA Jr., et al., 2013) is: 20%    Values used to calculate the score:      Age: 69 years      Sex: Male      Is Non- : No      Diabetic: No      Tobacco smoker: No      Systolic Blood Pressure: 138 mmHg      Is BP treated: No      HDL Cholesterol: 40 mg/dL      Total Cholesterol: 181 mg/dL    ROS  No fever or chills  No other muscle aches than those noted above  Mild morning cough    EXAM:  /88   Pulse 70   Temp 97.9  F (36.6  C)   Resp 14   Wt 96.2 kg (212 lb)   SpO2 98%   BMI 28.36 kg/m    Constitutional: Healthy, alert, no distress   Cardiovascular: RRR. No murmurs   Respiratory: Clear to auscultation   Musculoskeletal: discomfort with palpation along right greater trochanter. Ambulates without difficulty. No swelling or tenderness of the right knee to palpation.     ASSESSMENT    ICD-10-CM    1. Acute idiopathic gout of right knee M10.061    2. [place] cardiac risk >20% in next 10 years Z91.89 atorvastatin (LIPITOR) 40 MG tablet   3. Acute pain of right knee M25.561 indomethacin (INDOCIN) 25 MG capsule      Plan:  Patient Instructions   I suspect this is gout based on your exam and response to indomethacin. It is not necessary for uric acid levels to be elevated for an episode of gout to occur.  If symptom returns in the future start indomethacin early in  symptoms.  Would be OK to use one more round of indomethacin next week if not resolved over the weekend. (3 times a day for 5 days)    I do recommend atorvastatin 40mg nightly, initially start with 20mg nightly for 2 weeks then 40mg.  We should recheck CMP in 1-3 months.    We should follow your BP as well in the coming months. Could consider addition of ACE inhibitor such as lisinopril if BP rises to be consistently above 140/90.     Continue work with chiropractor regarding right hip/greater trochanteric discomfort.    I do not recommend xrays today.    Return in about 3 months (around 5/8/2019) for Lab Work, BP Recheck, Routine Visit.    Hayden Maloney MD  Family Medicine Physician

## 2019-02-27 ENCOUNTER — ANCILLARY PROCEDURE (OUTPATIENT)
Dept: GENERAL RADIOLOGY | Facility: CLINIC | Age: 69
End: 2019-02-27
Attending: FAMILY MEDICINE
Payer: COMMERCIAL

## 2019-02-27 ENCOUNTER — OFFICE VISIT (OUTPATIENT)
Dept: FAMILY MEDICINE | Facility: CLINIC | Age: 69
End: 2019-02-27
Payer: COMMERCIAL

## 2019-02-27 VITALS
HEART RATE: 70 BPM | WEIGHT: 206.8 LBS | OXYGEN SATURATION: 95 % | HEIGHT: 73 IN | DIASTOLIC BLOOD PRESSURE: 62 MMHG | SYSTOLIC BLOOD PRESSURE: 124 MMHG | BODY MASS INDEX: 27.41 KG/M2 | TEMPERATURE: 97.8 F | RESPIRATION RATE: 22 BRPM

## 2019-02-27 DIAGNOSIS — M25.561 CHRONIC PAIN OF RIGHT KNEE: ICD-10-CM

## 2019-02-27 DIAGNOSIS — G89.29 CHRONIC PAIN OF RIGHT KNEE: ICD-10-CM

## 2019-02-27 DIAGNOSIS — M25.461 SWELLING OF RIGHT KNEE JOINT: Primary | ICD-10-CM

## 2019-02-27 DIAGNOSIS — Z91.89 RISK FOR CORONARY ARTERY DISEASE BETWEEN 10% AND 20% IN NEXT 10 YEARS PER FRAMINGHAM SCORE: ICD-10-CM

## 2019-02-27 PROCEDURE — 99214 OFFICE O/P EST MOD 30 MIN: CPT | Performed by: FAMILY MEDICINE

## 2019-02-27 PROCEDURE — 73562 X-RAY EXAM OF KNEE 3: CPT | Mod: RT

## 2019-02-27 ASSESSMENT — MIFFLIN-ST. JEOR: SCORE: 1748.98

## 2019-02-27 NOTE — PROGRESS NOTES
"  SUBJECTIVE:   Sulaiman Marroquin is a 69 year old male who presents to clinic today for the following health issues:    Gout of Right Knee Followup:    Date of visit: 02/08/2019  Current Status: Still swollen, occasional pain at night      Additional concerns: Recheck Medication Indomethacin and Atorvastatin. Pt states there was some ache and was wondering if there's another kind of medication similar to Atorvastatin    He stopped doing a regular dose of indomethacin. He notes persistent swelling of the right knee. There remains a little discomfort of the right knee at night.    He wonders about the effects of atorvastatin - he has had discomfort of other joints.    ROS  No fever  No chest pain  No knee instability, but feels unreliable    EXAM:  /62   Pulse 70   Temp 97.8  F (36.6  C) (Oral)   Resp 22   Ht 1.842 m (6' 0.5\")   Wt 93.8 kg (206 lb 12.8 oz)   SpO2 95%   BMI 27.66 kg/m    Constitutional: Healthy, alert, no distress   Musculoskeletal: right knee swelling, no erythema no warmth, no ankle swelling    ASSESSMENT    ICD-10-CM    1. Swelling of right knee joint M25.461 ORTHOPEDICS ADULT REFERRAL   2. Chronic pain of right knee M25.561 XR Knee Right 3 Views    G89.29 ORTHOPEDICS ADULT REFERRAL   3. Risk for coronary artery disease between 10% and 20% in next 10 years per [place] score Z91.89 Comprehensive metabolic panel (BMP + Alb, Alk Phos, ALT, AST, Total. Bili, TP)      Plan:  Patient Instructions   If sleeplessness persists as you believe it relates to atorvastatin 40mg, could consider lower dose of atorvastatin or 10mg of simvastatin. This is a preventive care approach and your main risk factor is not cholesterol, BP, or diabetes, but age alone.  I do not believe statins relate to your other joint pain, they are more noted for muscle aches when pain is a side effect.  We should check a CMP in the next 2-3 months. Return for a lab visit in early to mid April.    Given that your " exam was marginal in its support of the diagnosis of gout in the past, uric acid level is normal, and the response to indomethacin was marginal - I recommend further eval with xray and ortho consultation. Differential includes meniscal or arthritic origin of pain. PT or MRI might be indicated, but would defer to ortho.       Return in about 2 months (around 4/27/2019) for after visit with ortho.    Hayden Maloney MD  Family Medicine Physician

## 2019-02-27 NOTE — PATIENT INSTRUCTIONS
If sleeplessness persists as you believe it relates to atorvastatin 40mg, could consider lower dose of atorvastatin or 10mg of simvastatin. This is a preventive care approach and your main risk factor is not cholesterol, BP, or diabetes, but age alone.  I do not believe statins relate to your other joint pain, they are more noted for muscle aches when pain is a side effect.  We should check a CMP in the next 2-3 months. Return for a lab visit in early to mid April.    Given that your exam was marginal in its support of the diagnosis of gout in the past, uric acid level is normal, and the response to indomethacin was marginal - I recommend further eval with xray and ortho consultation. Differential includes meniscal or arthritic origin of pain. PT or MRI might be indicated, but would defer to ortho.

## 2019-11-02 ENCOUNTER — HEALTH MAINTENANCE LETTER (OUTPATIENT)
Age: 69
End: 2019-11-02

## 2019-11-22 ENCOUNTER — TRANSFERRED RECORDS (OUTPATIENT)
Dept: HEALTH INFORMATION MANAGEMENT | Facility: CLINIC | Age: 69
End: 2019-11-22

## 2019-11-22 LAB — HEMOCCULT STL QL IA: NEGATIVE

## 2019-12-05 ENCOUNTER — TRANSFERRED RECORDS (OUTPATIENT)
Dept: HEALTH INFORMATION MANAGEMENT | Facility: CLINIC | Age: 69
End: 2019-12-05

## 2019-12-12 ENCOUNTER — OFFICE VISIT (OUTPATIENT)
Dept: FAMILY MEDICINE | Facility: CLINIC | Age: 69
End: 2019-12-12
Payer: COMMERCIAL

## 2019-12-12 VITALS
HEART RATE: 72 BPM | TEMPERATURE: 97.9 F | DIASTOLIC BLOOD PRESSURE: 75 MMHG | OXYGEN SATURATION: 99 % | BODY MASS INDEX: 28.3 KG/M2 | HEIGHT: 73 IN | WEIGHT: 213.5 LBS | RESPIRATION RATE: 18 BRPM | SYSTOLIC BLOOD PRESSURE: 149 MMHG

## 2019-12-12 DIAGNOSIS — J30.2 SEASONAL ALLERGIES: Primary | ICD-10-CM

## 2019-12-12 DIAGNOSIS — E78.5 HYPERLIPIDEMIA LDL GOAL <100: ICD-10-CM

## 2019-12-12 DIAGNOSIS — J45.20 MILD INTERMITTENT ASTHMA WITHOUT COMPLICATION: ICD-10-CM

## 2019-12-12 DIAGNOSIS — I10 BENIGN ESSENTIAL HYPERTENSION: ICD-10-CM

## 2019-12-12 PROCEDURE — 99214 OFFICE O/P EST MOD 30 MIN: CPT | Performed by: FAMILY MEDICINE

## 2019-12-12 RX ORDER — LISINOPRIL 5 MG/1
5 TABLET ORAL DAILY
Qty: 30 TABLET | Refills: 1 | Status: SHIPPED | OUTPATIENT
Start: 2019-12-12 | End: 2020-01-14

## 2019-12-12 ASSESSMENT — MIFFLIN-ST. JEOR: SCORE: 1779.37

## 2019-12-12 NOTE — PROGRESS NOTES
Subjective     Sulaiman Marroquin is a 69 year old male who presents to clinic today for the following health issues:    HPI     Presents today to establish care.  Blood pressure has been running high-- would like to consider treatment.  No CP or HA or SOB.  Has been high at various appts.    Establish Care   Prior PCP Eden sue k in armpit and skin tag.    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  Semi-retired in [place].  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  Foster kid- 34-- in [place] (refugee from [place]).  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    There is no problem list on file for this patient.    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      Family History   Problem Relation Age of Onset     Alzheimer Disease Mother      Coronary Artery Disease Mother      Gastrointestinal Disease Father      Arrhythmia Father      Anxiety Disorder Father      Diabetes Paternal Grandmother      Breast Cancer Maternal Grandmother          Current Outpatient Medications   Medication Sig Dispense Refill     albuterol (PROAIR HFA/PROVENTIL HFA/VENTOLIN HFA) 108 (90 Base) MCG/ACT inhaler Inhale 2 puffs into the lungs every 6 hours as needed for shortness of breath / dyspnea or wheezing 1 Inhaler 0     atorvastatin (LIPITOR) 40 MG tablet Take 1 tablet (40 mg) by mouth daily 90 tablet 3     Fexofenadine HCl (WAL-FEX PO) Take by mouth daily        MELATONIN PO Take 3 mg by mouth nightly as needed       Ascorbic Acid (VITAMIN C PO) Take 600  "mg by mouth daily       Calcium-Magnesium-Vitamin D (CALCIUM 500 PO) Take 1,000 mg by mouth daily       Lactobacillus (PROBIOTIC ACIDOPHILUS PO)        Misc Natural Products (URINOZINC PO) Take by mouth daily       Multiple Vitamins-Minerals (CENTRUM SILVER 50+MEN PO) Take by mouth daily       TURMERIC PO Take 1,330 mg by mouth daily       vitamin E 400 units TABS Take 400 Units by mouth daily       Allergies   Allergen Reactions     Dogs      Dust Mites      Asa [Aspirin] Rash     Recent Labs   Lab Test 01/31/19  0948 10/26/15  2338   *  --    HDL 40  --    TRIG 198*  --    CR 1.05 1.12   GFRESTIMATED 72 66   GFRESTBLACK 84 79   POTASSIUM 4.1 3.9      BP Readings from Last 3 Encounters:   02/27/19 124/62   02/08/19 138/88   01/31/19 149/78    Wt Readings from Last 3 Encounters:   12/12/19 96.8 kg (213 lb 8 oz)   02/27/19 93.8 kg (206 lb 12.8 oz)   02/08/19 96.2 kg (212 lb)                      Reviewed and updated as needed this visit by Provider         Review of Systems   ROS COMP: Constitutional, HEENT, cardiovascular, pulmonary, GI, , musculoskeletal, neuro, skin, endocrine and psych systems are negative, except as otherwise noted.      Objective    Ht 1.842 m (6' 0.5\")   Wt 96.8 kg (213 lb 8 oz)   BMI 28.56 kg/m    Body mass index is 28.56 kg/m .  Physical Exam   GENERAL: healthy, alert and no distress  EYES: Eyes grossly normal to inspection, PERRL and conjunctivae and sclerae normal  NECK: no adenopathy, no asymmetry, masses, or scars and thyroid normal to palpation  RESP: lungs clear to auscultation - no rales, rhonchi or wheezes  CV: regular rate and rhythm, normal S1 S2, no S3 or S4, no murmur, click or rub, no peripheral edema and peripheral pulses strong  ABDOMEN: soft, nontender, no hepatosplenomegaly, no masses and bowel sounds normal  MS: no gross musculoskeletal defects noted, no edema  SKIN: seborrheic keratosis and skin tag noted at base of LEFT axilla  NEURO: Normal strength and tone, " mentation intact and speech normal  PSYCH: mentation appears normal, affect normal/bright            Assessment & Plan     1. Seasonal allergies    Continue with OTC anthistamines prn relief of symptoms.    2. Mild intermittent asthma without complication    Stable with albuterol use prn.    3. Hyperlipidemia LDL goal <100    On statin.  Lipids and CPE due next 1-2020.      4. Benign essential hypertension    - lisinopril (PRINIVIL/ZESTRIL) 5 MG tablet; Take 1 tablet (5 mg) by mouth daily  Dispense: 30 tablet; Refill: 1     Agreeable to starting lisinopril 5 mg once daily to bring blood pressure to goal < 130/80.  Recommend Follow-up in 2-4 weeks for CPE and bp check for further titration prn.    Thu Troncoso MD  [facility]

## 2019-12-13 ASSESSMENT — ASTHMA QUESTIONNAIRES: ACT_TOTALSCORE: 20

## 2020-01-10 ASSESSMENT — ENCOUNTER SYMPTOMS
CONSTIPATION: 0
WEAKNESS: 0
COUGH: 1
NAUSEA: 0
SORE THROAT: 0
FREQUENCY: 0
FEVER: 0
HEARTBURN: 0
NERVOUS/ANXIOUS: 0
DYSURIA: 0
HEMATOCHEZIA: 0
MYALGIAS: 0
HEADACHES: 0
JOINT SWELLING: 1
SHORTNESS OF BREATH: 0
PARESTHESIAS: 0
DIARRHEA: 0
ARTHRALGIAS: 0
DIZZINESS: 0
HEMATURIA: 0
PALPITATIONS: 0
ABDOMINAL PAIN: 0
CHILLS: 0
EYE PAIN: 0

## 2020-01-10 ASSESSMENT — ACTIVITIES OF DAILY LIVING (ADL): CURRENT_FUNCTION: NO ASSISTANCE NEEDED

## 2020-01-14 ENCOUNTER — OFFICE VISIT (OUTPATIENT)
Dept: FAMILY MEDICINE | Facility: CLINIC | Age: 70
End: 2020-01-14
Payer: COMMERCIAL

## 2020-01-14 VITALS
HEART RATE: 72 BPM | HEIGHT: 73 IN | SYSTOLIC BLOOD PRESSURE: 118 MMHG | WEIGHT: 212 LBS | DIASTOLIC BLOOD PRESSURE: 66 MMHG | RESPIRATION RATE: 16 BRPM | BODY MASS INDEX: 28.1 KG/M2 | TEMPERATURE: 96.6 F

## 2020-01-14 DIAGNOSIS — E78.5 HYPERLIPIDEMIA LDL GOAL <130: ICD-10-CM

## 2020-01-14 DIAGNOSIS — Z00.00 ENCOUNTER FOR MEDICARE ANNUAL WELLNESS EXAM: Primary | ICD-10-CM

## 2020-01-14 DIAGNOSIS — I10 BENIGN ESSENTIAL HYPERTENSION: ICD-10-CM

## 2020-01-14 DIAGNOSIS — I72.2 ANEURYSM OF RIGHT RENAL ARTERY (H): ICD-10-CM

## 2020-01-14 DIAGNOSIS — J45.30 MILD PERSISTENT ASTHMA WITHOUT COMPLICATION: ICD-10-CM

## 2020-01-14 LAB
ALBUMIN SERPL-MCNC: 3.7 G/DL (ref 3.4–5)
ALP SERPL-CCNC: 60 U/L (ref 40–150)
ALT SERPL W P-5'-P-CCNC: 47 U/L (ref 0–70)
ANION GAP SERPL CALCULATED.3IONS-SCNC: 6 MMOL/L (ref 3–14)
AST SERPL W P-5'-P-CCNC: 26 U/L (ref 0–45)
BILIRUB SERPL-MCNC: 0.6 MG/DL (ref 0.2–1.3)
BUN SERPL-MCNC: 19 MG/DL (ref 7–30)
CALCIUM SERPL-MCNC: 9.1 MG/DL (ref 8.5–10.1)
CHLORIDE SERPL-SCNC: 107 MMOL/L (ref 94–109)
CHOLEST SERPL-MCNC: 111 MG/DL
CO2 SERPL-SCNC: 26 MMOL/L (ref 20–32)
CREAT SERPL-MCNC: 1.04 MG/DL (ref 0.66–1.25)
FEF 25/75: NORMAL
FEV-1: NORMAL
FEV1/FVC: NORMAL
FVC: NORMAL
GFR SERPL CREATININE-BSD FRML MDRD: 72 ML/MIN/{1.73_M2}
GLUCOSE SERPL-MCNC: 103 MG/DL (ref 70–99)
HDLC SERPL-MCNC: 40 MG/DL
LDLC SERPL CALC-MCNC: 48 MG/DL
NONHDLC SERPL-MCNC: 71 MG/DL
POTASSIUM SERPL-SCNC: 4.3 MMOL/L (ref 3.4–5.3)
PROT SERPL-MCNC: 7.5 G/DL (ref 6.8–8.8)
SODIUM SERPL-SCNC: 139 MMOL/L (ref 133–144)
TRIGL SERPL-MCNC: 114 MG/DL

## 2020-01-14 PROCEDURE — 94010 BREATHING CAPACITY TEST: CPT | Performed by: FAMILY MEDICINE

## 2020-01-14 PROCEDURE — 99214 OFFICE O/P EST MOD 30 MIN: CPT | Mod: 25 | Performed by: FAMILY MEDICINE

## 2020-01-14 PROCEDURE — 80053 COMPREHEN METABOLIC PANEL: CPT | Performed by: FAMILY MEDICINE

## 2020-01-14 PROCEDURE — 80061 LIPID PANEL: CPT | Performed by: FAMILY MEDICINE

## 2020-01-14 PROCEDURE — 36415 COLL VENOUS BLD VENIPUNCTURE: CPT | Performed by: FAMILY MEDICINE

## 2020-01-14 PROCEDURE — G0439 PPPS, SUBSEQ VISIT: HCPCS | Performed by: FAMILY MEDICINE

## 2020-01-14 RX ORDER — ATORVASTATIN CALCIUM 40 MG/1
40 TABLET, FILM COATED ORAL DAILY
Qty: 90 TABLET | Refills: 3 | Status: SHIPPED | OUTPATIENT
Start: 2020-01-14 | End: 2021-04-23

## 2020-01-14 RX ORDER — FLUTICASONE PROPIONATE AND SALMETEROL XINAFOATE 115; 21 UG/1; UG/1
2 AEROSOL, METERED RESPIRATORY (INHALATION) 2 TIMES DAILY
Qty: 1 INHALER | Refills: 3 | Status: SHIPPED | OUTPATIENT
Start: 2020-01-14 | End: 2020-02-21

## 2020-01-14 RX ORDER — INDOMETHACIN 25 MG/1
CAPSULE ORAL
COMMUNITY
Start: 2019-01-31 | End: 2020-01-14

## 2020-01-14 RX ORDER — LISINOPRIL 5 MG/1
5 TABLET ORAL DAILY
Qty: 90 TABLET | Refills: 3 | Status: SHIPPED | OUTPATIENT
Start: 2020-01-14 | End: 2021-02-17

## 2020-01-14 ASSESSMENT — ENCOUNTER SYMPTOMS
HEMATURIA: 0
PARESTHESIAS: 0
NERVOUS/ANXIOUS: 0
JOINT SWELLING: 1
ARTHRALGIAS: 0
CONSTIPATION: 0
FREQUENCY: 0
FEVER: 0
HEARTBURN: 0
NAUSEA: 0
SHORTNESS OF BREATH: 0
HEMATOCHEZIA: 0
ABDOMINAL PAIN: 0
EYE PAIN: 0
HEADACHES: 0
DIZZINESS: 0
COUGH: 1
DYSURIA: 0
CHILLS: 0
MYALGIAS: 0
PALPITATIONS: 0
SORE THROAT: 0
DIARRHEA: 0
WEAKNESS: 0

## 2020-01-14 ASSESSMENT — MIFFLIN-ST. JEOR: SCORE: 1780.51

## 2020-01-14 ASSESSMENT — ACTIVITIES OF DAILY LIVING (ADL): CURRENT_FUNCTION: NO ASSISTANCE NEEDED

## 2020-01-14 NOTE — PROGRESS NOTES
"SUBJECTIVE:   Sulaiman Marroquin is a 69 year old male who presents for Preventive Visit  Are you in the first 12 months of your Medicare coverage?  No    Healthy Habits:     In general, how would you rate your overall health?  Excellent    Frequency of exercise:  None    Do you usually eat at least 4 servings of fruit and vegetables a day, include whole grains    & fiber and avoid regularly eating high fat or \"junk\" foods?  Yes    Taking medications regularly:  Yes    Medication side effects:  None    Ability to successfully perform activities of daily living:  No assistance needed    Home Safety:  Throw rugs in the hallway and lack of grab bars in the bathroom    Hearing Impairment:  No hearing concerns    In the past 6 months, have you been bothered by leaking of urine?  No    In general, how would you rate your overall mental or emotional health?  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  Semi-retired in [employer].  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  Foster kid- 34-- in [place] (refugee from [place]).  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     Additional Concerns  Presents today for blood pressure check after starting lisinopril at beginning of December.  No side effects of the ACE-I.  Feels well.  On statin- needs refill and lipid panel.  Continued increased cough at night not resolving with albuterol.  Has hx of using Advair in past with good results.    Notes hx of R renal artery aneurysm last checked in 2016-- requests follow up for this.    Do you feel safe in your environment? Yes    Have you ever done Advance Care Planning? " (For example, a Health Directive, POLST, or a discussion with a medical provider or your loved ones about your wishes): Yes, patient states has an Advance Care Planning document and will bring a copy to the clinic.      Fall risk  Fallen 2 or more times in the past year?: No  Any fall with injury in the past year?: No    Cognitive Screening   1) Repeat 3 items (Leader, Season, Table)    2) Clock draw: NORMAL  3) 3 item recall: Recalls 3 objects  Results: NORMAL clock, 1-2 items recalled: COGNITIVE IMPAIRMENT LESS LIKELY    Mini-CogTM Copyright S Nora. Licensed by the author for use in [facility]; reprinted with permission (soob@[facility]). All rights reserved.      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 Not applicable    Alcohol Use 1/14/2020   Prescreen: >3 drinks/day or >7 drinks/week? -   Prescreen: >3 drinks/day or >7 drinks/week? Not Applicable               Current providers sharing in care for this patient include:   Patient Care Team:  Hayden Rosario MD as PCP - General (Family Practice)  Thu Troncoso MD as Assigned PCP    The following health maintenance items are reviewed in Epic and correct as of today:  Health Maintenance   Topic Date Due     ASTHMA ACTION PLAN  1950     COLONOSCOPY  01/22/1960     ZOSTER IMMUNIZATION (1 of 2) 01/22/2000     MEDICARE ANNUAL WELLNESS VISIT  11/02/2019     PNEUMOCOCCAL IMMUNIZATION 65+ LOW/MEDIUM RISK (2 of 2 - PPSV23) 11/02/2019     ASTHMA  CONTROL TEST  06/12/2020     FALL RISK ASSESSMENT  12/12/2020     ADVANCE CARE PLANNING  11/02/2023     LIPID  01/31/2024     DTAP/TDAP/TD IMMUNIZATION (2 - Td) 11/02/2028     HEPATITIS C SCREENING  Completed     PHQ-2  Completed     INFLUENZA VACCINE  Completed     AORTIC ANEURYSM SCREENING (SYSTEM ASSIGNED)  Completed     IPV IMMUNIZATION  Aged Out     MENINGITIS IMMUNIZATION  Aged Out     BP Readings from Last 3 Encounters:   01/14/20 118/66   12/12/19 (!) 149/75   02/27/19 124/62    Wt Readings from Last 3 Encounters:   01/14/20 96.2 kg (212 lb)   12/12/19 96.8 kg (213 lb 8 oz)   02/27/19 93.8 kg (206 lb 12.8 oz)                  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      Family History   Problem Relation Age of Onset     Alzheimer Disease Mother      Coronary Artery Disease Mother      Gastrointestinal Disease Father      Arrhythmia Father      Anxiety Disorder Father      Diabetes Paternal Grandmother      Breast Cancer Maternal Grandmother          Current Outpatient Medications   Medication Sig Dispense Refill     albuterol (PROAIR HFA/PROVENTIL HFA/VENTOLIN HFA) 108 (90 Base) MCG/ACT inhaler Inhale 2 puffs into the lungs every 6 hours as needed for shortness of breath / dyspnea or wheezing 1 Inhaler 0     Ascorbic Acid (VITAMIN C PO) Take 600 mg by mouth daily       atorvastatin (LIPITOR) 40 MG tablet Take 1 tablet (40 mg) by mouth daily 90 tablet 3     Calcium-Magnesium-Vitamin D (CALCIUM 500 PO) Take 1,000 mg by mouth daily       Fexofenadine HCl (WAL-FEX PO) Take by mouth daily         "fluticasone-salmeterol (ADVAIR-HFA) 115-21 MCG/ACT inhaler Inhale 2 puffs into the lungs 2 times daily 1 Inhaler 3     Lactobacillus (PROBIOTIC ACIDOPHILUS PO)        lisinopril (PRINIVIL/ZESTRIL) 5 MG tablet Take 1 tablet (5 mg) by mouth daily 90 tablet 3     MELATONIN PO Take 3 mg by mouth nightly as needed       Misc Natural Products (URINOZINC PO) Take by mouth daily       Multiple Vitamins-Minerals (CENTRUM SILVER 50+MEN PO) Take by mouth daily       TURMERIC PO Take 1,330 mg by mouth daily       vitamin E 400 units TABS Take 400 Units by mouth daily       Allergies   Allergen Reactions     Dogs      Dust Mites      Asa [Aspirin] Rash     Recent Labs   Lab Test 01/31/19  0948 10/26/15  2338   *  --    HDL 40  --    TRIG 198*  --    CR 1.05 1.12   GFRESTIMATED 72 66   GFRESTBLACK 84 79   POTASSIUM 4.1 3.9          Review of Systems   Constitutional: Negative for chills and fever.   HENT: Positive for congestion. Negative for ear pain, hearing loss and sore throat.    Eyes: Negative for pain and visual disturbance.   Respiratory: Positive for cough. Negative for shortness of breath.    Cardiovascular: Negative for chest pain, palpitations and peripheral edema.   Gastrointestinal: Negative for abdominal pain, constipation, diarrhea, heartburn, hematochezia and nausea.   Genitourinary: Negative for discharge, dysuria, frequency, genital sores, hematuria, impotence and urgency.   Musculoskeletal: Positive for joint swelling. Negative for arthralgias and myalgias.   Skin: Negative for rash.   Neurological: Negative for dizziness, weakness, headaches and paresthesias.   Psychiatric/Behavioral: Negative for mood changes. The patient is not nervous/anxious.      OBJECTIVE:   /66   Pulse 72   Temp 96.6  F (35.9  C) (Tympanic)   Resp 16   Ht 1.854 m (6' 1\")   Wt 96.2 kg (212 lb)   BMI 27.97 kg/m   Estimated body mass index is 27.97 kg/m  as calculated from the following:    Height as of this encounter: " "1.854 m (6' 1\").    Weight as of this encounter: 96.2 kg (212 lb).  Physical Exam  GENERAL: healthy, alert and no distress  EYES: Eyes grossly normal to inspection, PERRL and conjunctivae and sclerae normal  HENT: ear canals and TM's normal, nose and mouth without ulcers or lesions  NECK: no adenopathy, no asymmetry, masses, or scars and thyroid normal to palpation  RESP: lungs clear to auscultation - no rales, rhonchi or wheezes  CV: regular rate and rhythm, normal S1 S2, no S3 or S4, no murmur, click or rub, no peripheral edema and peripheral pulses strong  ABDOMEN: soft, nontender, no hepatosplenomegaly, no masses and bowel sounds normal  MS: no gross musculoskeletal defects noted, no edema  SKIN: no suspicious lesions or rashes  NEURO: Normal strength and tone, mentation intact and speech normal  PSYCH: mentation appears normal, affect normal/bright    ASSESSMENT / PLAN:   1. Encounter for Medicare annual wellness exam    Continue good diet and exercise.  Due for PCV 23 in pharmacy.  SUZANNE signed today for [facility] with colonscopy done in past 5-6 years and was normal per patient.    2. Benign essential hypertension    - lisinopril (PRINIVIL/ZESTRIL) 5 MG tablet; Take 1 tablet (5 mg) by mouth daily  Dispense: 90 tablet; Refill: 3  - Comprehensive metabolic panel    Stable on ACE-I-- refilled today.  CMP.    3. Hyperlipidemia LDL goal <130    - Comprehensive metabolic panel  - Lipid panel reflex to direct LDL Fasting    Recheck labs today.    4. Mild persistent asthma without complication    - fluticasone-salmeterol (ADVAIR-HFA) 115-21 MCG/ACT inhaler; Inhale 2 puffs into the lungs 2 times daily  Dispense: 1 Inhaler; Refill: 3  - Spirometry, Breathing Capacity: Normal Order, Clinic Performed    Spirometry reflecting mild persistent asthma-- start Advair to better control.  Follow-up 4-6 weeks for recheck.    5. Aneurysm of right renal artery (H)    - CTV Abdomen Pelvis w Contrast; Future    Recheck for surveillance-- " "last done in 2016.  Follow-up with Nephrology prn pending results.    COUNSELING:  Reviewed preventive health counseling, as reflected in patient instructions       Regular exercise       Healthy diet/nutrition    Estimated body mass index is 27.97 kg/m  as calculated from the following:    Height as of this encounter: 1.854 m (6' 1\").    Weight as of this encounter: 96.2 kg (212 lb).    Weight management plan: Discussed healthy diet and exercise guidelines     reports that he has never smoked. He has never used smokeless tobacco.      Appropriate preventive services were discussed with this patient, including applicable screening as appropriate for cardiovascular disease, diabetes, osteopenia/osteoporosis, and glaucoma.  As appropriate for age/gender, discussed screening for colorectal cancer, prostate cancer, breast cancer, and cervical cancer. Checklist reviewing preventive services available has been given to the patient.    Reviewed patients plan of care and provided an AVS. The Intermediate Care Plan ( asthma action plan, low back pain action plan, and migraine action plan) for Sulaiman meets the Care Plan requirement. This Care Plan has been established and reviewed with the Patient.    Counseling Resources:  ATP IV Guidelines  Pooled Cohorts Equation Calculator  Breast Cancer Risk Calculator  FRAX Risk Assessment  ICSI Preventive Guidelines  Dietary Guidelines for Americans, 2010  [employer]'s MyPlate  ASA Prophylaxis  Lung CA Screening    Thu Troncoso MD  [facility]    Identified Health Risks:  "

## 2020-01-14 NOTE — PATIENT INSTRUCTIONS
Patient Education   Personalized Prevention Plan  You are due for the preventive services outlined below.  Your care team is available to assist you in scheduling these services.  If you have already completed any of these items, please share that information with your care team to update in your medical record.  Health Maintenance Due   Topic Date Due     Asthma Action Plan - yearly  1950     Colonoscopy  01/22/1960     Zoster (Shingles) Vaccine (1 of 2) 01/22/2000     Annual Wellness Visit  11/02/2019     Pneumococcal Vaccine (2 of 2 - PPSV23) 11/02/2019

## 2020-01-28 ENCOUNTER — HOSPITAL ENCOUNTER (OUTPATIENT)
Dept: CT IMAGING | Facility: CLINIC | Age: 70
Discharge: HOME | End: 2020-01-28
Attending: FAMILY MEDICINE | Admitting: FAMILY MEDICINE
Payer: COMMERCIAL

## 2020-01-28 DIAGNOSIS — I72.2 ANEURYSM OF RIGHT RENAL ARTERY (H): ICD-10-CM

## 2020-01-28 PROCEDURE — 25000128 H RX IP 250 OP 636: Performed by: FAMILY MEDICINE

## 2020-01-28 PROCEDURE — 74174 CTA ABD&PLVS W/CONTRAST: CPT

## 2020-01-28 PROCEDURE — 25000125 ZZHC RX 250: Performed by: FAMILY MEDICINE

## 2020-01-28 RX ORDER — IOPAMIDOL 755 MG/ML
80 INJECTION, SOLUTION INTRAVASCULAR ONCE
Status: COMPLETED | OUTPATIENT
Start: 2020-01-28 | End: 2020-01-28

## 2020-01-28 RX ADMIN — IOPAMIDOL 80 ML: 755 INJECTION, SOLUTION INTRAVENOUS at 08:36

## 2020-01-28 RX ADMIN — SODIUM CHLORIDE 80 ML: 9 INJECTION, SOLUTION INTRAVENOUS at 08:36

## 2020-02-21 ENCOUNTER — OFFICE VISIT (OUTPATIENT)
Dept: FAMILY MEDICINE | Facility: CLINIC | Age: 70
End: 2020-02-21
Payer: COMMERCIAL

## 2020-02-21 VITALS
HEART RATE: 64 BPM | DIASTOLIC BLOOD PRESSURE: 72 MMHG | BODY MASS INDEX: 27.84 KG/M2 | SYSTOLIC BLOOD PRESSURE: 120 MMHG | OXYGEN SATURATION: 98 % | WEIGHT: 211 LBS | RESPIRATION RATE: 18 BRPM

## 2020-02-21 DIAGNOSIS — I10 BENIGN ESSENTIAL HYPERTENSION: ICD-10-CM

## 2020-02-21 DIAGNOSIS — J45.30 MILD PERSISTENT ASTHMA WITHOUT COMPLICATION: Primary | ICD-10-CM

## 2020-02-21 PROCEDURE — 99214 OFFICE O/P EST MOD 30 MIN: CPT | Performed by: FAMILY MEDICINE

## 2020-02-21 RX ORDER — FLUTICASONE PROPIONATE AND SALMETEROL XINAFOATE 115; 21 UG/1; UG/1
2 AEROSOL, METERED RESPIRATORY (INHALATION) 2 TIMES DAILY
Qty: 1 INHALER | Refills: 11 | Status: SHIPPED | OUTPATIENT
Start: 2020-02-21 | End: 2020-07-21

## 2020-02-21 NOTE — PROGRESS NOTES
Subjective         HPI   Presents today for recheck of his blood pressure and asthma after recent medication adjustments.    Started back on Advair in past month.  Had started to have increased wheezing and catching with deep breaths.  Feels much better back on Advair.  ACT 25 today.    Blood pressure today is also well controlled with recent addition of lisinopril 5 mg once daily.    Overall feeling very well!  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  Will be traveling there with spouse later this winter.      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      Family History   Problem Relation Age of Onset     Alzheimer Disease Mother      Coronary Artery Disease Mother      Gastrointestinal Disease Father      Arrhythmia Father      Anxiety Disorder Father      Diabetes Paternal Grandmother      Breast Cancer Maternal Grandmother          Current Outpatient Medications   Medication Sig Dispense Refill     albuterol (PROAIR HFA/PROVENTIL HFA/VENTOLIN HFA) 108 (90 Base) MCG/ACT inhaler Inhale 2 puffs into the lungs every 6 hours as needed for shortness of breath / dyspnea or wheezing 1 Inhaler 0     Ascorbic Acid (VITAMIN C PO) Take 600 mg by mouth daily       atorvastatin (LIPITOR) 40 MG tablet Take 1 tablet (40 mg) by mouth daily 90 tablet 3     Calcium-Magnesium-Vitamin D (CALCIUM 500 PO) Take 1,000 mg by mouth daily       Fexofenadine HCl (WAL-FEX PO) Take by mouth daily        fluticasone-salmeterol (ADVAIR-HFA) 115-21 MCG/ACT inhaler Inhale 2 puffs into the lungs 2  times daily 1 Inhaler 3     Lactobacillus (PROBIOTIC ACIDOPHILUS PO)        lisinopril (PRINIVIL/ZESTRIL) 5 MG tablet Take 1 tablet (5 mg) by mouth daily 90 tablet 3     MELATONIN PO Take 3 mg by mouth nightly as needed       Misc Natural Products (URINOZINC PO) Take by mouth daily       Multiple Vitamins-Minerals (CENTRUM SILVER 50+MEN PO) Take by mouth daily       TURMERIC PO Take 1,330 mg by mouth daily       vitamin E 400 units TABS Take 400 Units by mouth daily       Allergies   Allergen Reactions     Dogs      Dust Mites      Asa [Aspirin] Rash     Recent Labs   Lab Test 01/14/20  0840 01/31/19  0948   LDL 48 101*   HDL 40 40   TRIG 114 198*   ALT 47  --    CR 1.04 1.05   GFRESTIMATED 72 72   GFRESTBLACK 84 84   POTASSIUM 4.3 4.1      BP Readings from Last 3 Encounters:   02/21/20 120/72   01/14/20 118/66   12/12/19 (!) 149/75    Wt Readings from Last 3 Encounters:   02/21/20 95.7 kg (211 lb)   01/14/20 96.2 kg (212 lb)   12/12/19 96.8 kg (213 lb 8 oz)                    Reviewed and updated as needed this visit by Provider         Review of Systems   ROS COMP: Constitutional, HEENT, cardiovascular, pulmonary, GI, , musculoskeletal, neuro, skin, endocrine and psych systems are negative, except as otherwise noted.      Objective    /72   Pulse 64   Resp 18   Wt 95.7 kg (211 lb)   SpO2 98%   BMI 27.84 kg/m      Physical Exam   GENERAL: healthy, alert and no distress  EYES: Eyes grossly normal to inspection, PERRL and conjunctivae and sclerae normal  HENT: ear canals and TM's normal, nose and mouth without ulcers or lesions  NECK: no adenopathy, no asymmetry, masses, or scars and thyroid normal to palpation  RESP: lungs clear to auscultation - no rales, rhonchi or wheezes  CV: regular rate and rhythm, normal S1 S2, no S3 or S4, no murmur, click or rub, no peripheral edema and peripheral pulses strong  ABDOMEN: soft, nontender, no hepatosplenomegaly, no masses and bowel sounds normal  MS: no gross  musculoskeletal defects noted, no edema  SKIN: no suspicious lesions or rashes  NEURO: Normal strength and tone, mentation intact and speech normal  PSYCH: mentation appears normal, affect normal/bright        Assessment & Plan     1. Mild persistent asthma without complication    Stable on Advair at this time.  May decide to remain on this year-round or only in winter months when asthma is triggered.     2. Benign essential hypertension    Stable on lisinopril.       Thu Troncoso MD  [facility]

## 2020-02-22 ASSESSMENT — ASTHMA QUESTIONNAIRES: ACT_TOTALSCORE: 25

## 2020-07-21 ENCOUNTER — OFFICE VISIT (OUTPATIENT)
Dept: FAMILY MEDICINE | Facility: CLINIC | Age: 70
End: 2020-07-21
Payer: COMMERCIAL

## 2020-07-21 VITALS
RESPIRATION RATE: 16 BRPM | BODY MASS INDEX: 26.55 KG/M2 | DIASTOLIC BLOOD PRESSURE: 60 MMHG | SYSTOLIC BLOOD PRESSURE: 120 MMHG | HEART RATE: 71 BPM | OXYGEN SATURATION: 98 % | WEIGHT: 201.2 LBS | TEMPERATURE: 98 F

## 2020-07-21 DIAGNOSIS — I10 BENIGN ESSENTIAL HYPERTENSION: Primary | ICD-10-CM

## 2020-07-21 DIAGNOSIS — J45.30 MILD PERSISTENT ASTHMA WITHOUT COMPLICATION: ICD-10-CM

## 2020-07-21 PROCEDURE — 99214 OFFICE O/P EST MOD 30 MIN: CPT | Performed by: FAMILY MEDICINE

## 2020-07-21 RX ORDER — FLUTICASONE PROPIONATE AND SALMETEROL XINAFOATE 115; 21 UG/1; UG/1
2 AEROSOL, METERED RESPIRATORY (INHALATION) 2 TIMES DAILY
Qty: 1 INHALER | Refills: 11 | Status: SHIPPED | OUTPATIENT
Start: 2020-07-21 | End: 2021-03-05

## 2020-07-21 RX ORDER — ALBUTEROL SULFATE 90 UG/1
AEROSOL, METERED RESPIRATORY (INHALATION)
Qty: 1 INHALER | Refills: 3 | Status: SHIPPED | OUTPATIENT
Start: 2020-07-21 | End: 2021-04-23

## 2020-07-21 NOTE — PATIENT INSTRUCTIONS
DUE for PCV 23 and first SHINGRIX due    Lifestyle Medicine Resources  Documentaries to get you started:  [place]  The Gamechangers  What the Health    Websites to browse:  http://www.b3 bio.[place] group/site/plant-based-nutrition/  https://nutritionstudies.org/  https://www.Walkmore.com/    Books to explore:  How Not to Die: Discover the Foods Scientifically Proven to Prevent and Reverse Disease by Mohamud Alvarez MD  How Not to Diet: The Groundbreaking Science of Healthy, Permanent Weight Loss by Mohamud Alvarez MD  Fiber Fueled by Ramon Chandler MD  Undo It! How Simple Lifestyle Changes Can Revers Most Chronic Disease by Marvin Acuna MD  The Lifestyle Medicine Handbook by Cleo Leavitt  The [place] Study by SUZE Loredo  The Omnivore's Dilemma by Mohamud Romero  Why We Sleep by Scott Ellison, PhD  The Spectrum by Marvin Acuna MD  Super Immunity by Roc Garcia MD  Kick Diabetes Essentials: The Diet and Lifestyle Guide by Stephenie Denson  The Obesity Code by Tesfaye Kaba MD  The Longevity Diet by Binu Martini  Food Rules: An Eater's Manual by Mohamud Romero  Cooked: A Natural History of Transformation by Mohamud Romero  Food Politics by Brenna Cornell    Feel free to follow my Facebook page called Plant Based MD--I will be adding pertinent articles and info regularly in the weeks and months ahead!    And two great quotes I will end with:     He that takes medicine and neglects diet, wastes the time of his doctor.  Ancient Chinese Proverb   The doctor of the future will no longer treat the human frame with drugs, but rather will cure and prevent disease with nutrition.  Hemanth Quiroga    I look forward to partnering with you as we all strive to live a healthy lifestyle and be well!  Cheers!  MD [email or web address] [email or web address]

## 2020-07-21 NOTE — PROGRESS NOTES
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  Semi-retired in [employer].  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    Here today to Follow-up on asthma.  Has been feeling well on current regimen.  Has not had any acute illness.  Has been social distancing and seeing clients via video visits.    He has been working at weight loss on [employer].  He is down 9 pounds from last visit 6 months ago.    Blood pressure check today shows he remains stable on current regimen.    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      Family History   Problem Relation Age of Onset     Alzheimer Disease Mother      Coronary Artery Disease Mother      Gastrointestinal Disease Father      Arrhythmia Father      Anxiety Disorder Father      Diabetes Paternal Grandmother      Breast Cancer Maternal Grandmother          Current Outpatient Medications   Medication Sig Dispense Refill     albuterol (PROAIR HFA/PROVENTIL HFA/VENTOLIN HFA) 108 (90 Base) MCG/ACT inhaler 1-2 puffs q4-6 hours prn SOB 1 Inhaler 3      Ascorbic Acid (VITAMIN C PO) Take 600 mg by mouth daily       atorvastatin (LIPITOR) 40 MG tablet Take 1 tablet (40 mg) by mouth daily 90 tablet 3     Calcium-Magnesium-Vitamin D (CALCIUM 500 PO) Take 1,000 mg by mouth daily       Fexofenadine HCl (WAL-FEX PO) Take by mouth daily        fluticasone-salmeterol (ADVAIR-HFA) 115-21 MCG/ACT inhaler Inhale 2 puffs into the lungs 2 times daily 1 Inhaler 11     Lactobacillus (PROBIOTIC ACIDOPHILUS PO)        lisinopril (PRINIVIL/ZESTRIL) 5 MG tablet Take 1 tablet (5 mg) by mouth daily 90 tablet 3     MELATONIN PO Take 3 mg by mouth nightly as needed       Misc Natural Products (URINOZINC PO) Take by mouth daily       Multiple Vitamins-Minerals (CENTRUM SILVER 50+MEN PO) Take by mouth daily       TURMERIC PO Take 1,330 mg by mouth daily       vitamin E 400 units TABS Take 400 Units by mouth daily       Allergies   Allergen Reactions     Dogs      Dust Mites      Asa [Aspirin] Rash     Recent Labs   Lab Test 01/14/20  0840 01/31/19  0948   LDL 48 101*   HDL 40 40   TRIG 114 198*   ALT 47  --    CR 1.04 1.05   GFRESTIMATED 72 72   GFRESTBLACK 84 84   POTASSIUM 4.3 4.1      BP Readings from Last 3 Encounters:   07/21/20 120/60   02/21/20 120/72   01/14/20 118/66    Wt Readings from Last 3 Encounters:   07/21/20 91.3 kg (201 lb 3.2 oz)   02/21/20 95.7 kg (211 lb)   01/14/20 96.2 kg (212 lb)                    Reviewed and updated as needed this visit by Provider         Review of Systems   Constitutional, HEENT, cardiovascular, pulmonary, GI, , musculoskeletal, neuro, skin, endocrine and psych systems are negative, except as otherwise noted.      Objective    /60   Pulse 71   Temp 98  F (36.7  C)   Resp 16   Wt 91.3 kg (201 lb 3.2 oz)   SpO2 98%   BMI 26.55 kg/m      Physical Exam   GENERAL: healthy, alert and no distress  EYES: Eyes grossly normal to inspection, PERRL and conjunctivae and sclerae normal  NECK: no adenopathy, no asymmetry, masses, or scars and  thyroid normal to palpation  RESP: lungs clear to auscultation - no rales, rhonchi or wheezes  CV: regular rate and rhythm, normal S1 S2, no S3 or S4, no murmur, click or rub, no peripheral edema and peripheral pulses strong  ABDOMEN: soft, nontender, no hepatosplenomegaly, no masses and bowel sounds normal  MS: no gross musculoskeletal defects noted, no edema  SKIN: no suspicious lesions or rashes  NEURO: Normal strength and tone, mentation intact and speech normal  PSYCH: mentation appears normal, affect normal/bright        Assessment & Plan     1. Mild persistent asthma without complication    - albuterol (PROAIR HFA/PROVENTIL HFA/VENTOLIN HFA) 108 (90 Base) MCG/ACT inhaler; 1-2 puffs q4-6 hours prn SOB  Dispense: 1 Inhaler; Refill: 3  - fluticasone-salmeterol (ADVAIR-HFA) 115-21 MCG/ACT inhaler; Inhale 2 puffs into the lungs 2 times daily  Dispense: 1 Inhaler; Refill: 11    Stable on current regimen.  ACT 23.  Follow-up 6 months for CPE-- sooner prn.    2. Benign essential hypertension    Stable on current lisinopril 5 mg daily.  With continued weight loss- may be able to discontinue this if home readings dip into the teens systolically.  Encouraged to monitor at home and applauded for efforts.    Vaccines recommended to be done at pharmacy PCV 23 and Shingrix #1 today.       Thu Troncoso MD  [facility]

## 2020-07-22 ASSESSMENT — ASTHMA QUESTIONNAIRES: ACT_TOTALSCORE: 23

## 2020-11-14 ENCOUNTER — HEALTH MAINTENANCE LETTER (OUTPATIENT)
Age: 70
End: 2020-11-14

## 2021-02-14 DIAGNOSIS — I10 BENIGN ESSENTIAL HYPERTENSION: ICD-10-CM

## 2021-02-17 ENCOUNTER — MYC MEDICAL ADVICE (OUTPATIENT)
Dept: FAMILY MEDICINE | Facility: CLINIC | Age: 71
End: 2021-02-17

## 2021-02-17 RX ORDER — LISINOPRIL 5 MG/1
TABLET ORAL
Qty: 90 TABLET | Refills: 0 | Status: SHIPPED | OUTPATIENT
Start: 2021-02-17 | End: 2021-04-23

## 2021-03-03 ENCOUNTER — IMMUNIZATION (OUTPATIENT)
Dept: NURSING | Facility: CLINIC | Age: 71
End: 2021-03-03
Payer: COMMERCIAL

## 2021-03-03 PROCEDURE — 91301 PR COVID VAC MODERNA 100 MCG/0.5 ML IM: CPT

## 2021-03-03 PROCEDURE — 0011A PR COVID VAC MODERNA 100 MCG/0.5 ML IM: CPT

## 2021-03-10 ENCOUNTER — MYC MEDICAL ADVICE (OUTPATIENT)
Dept: FAMILY MEDICINE | Facility: CLINIC | Age: 71
End: 2021-03-10

## 2021-03-10 NOTE — TELEPHONE ENCOUNTER
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 Wants on paper    Can you help with this or What do you advise?  Denisse Ruiz RN

## 2021-03-11 NOTE — TELEPHONE ENCOUNTER
Writer responded via [employer].  MANJIT DriscollN, RN  [facility]

## 2021-03-11 NOTE — TELEPHONE ENCOUNTER
Please ask patient to discuss with his pharmacist to find cheapest inhaler and I will send in rx then

## 2021-03-18 ENCOUNTER — MYC MEDICAL ADVICE (OUTPATIENT)
Dept: FAMILY MEDICINE | Facility: CLINIC | Age: 71
End: 2021-03-18

## 2021-03-18 DIAGNOSIS — J45.30 MILD PERSISTENT ASTHMA WITHOUT COMPLICATION: Primary | ICD-10-CM

## 2021-03-18 RX ORDER — FLUTICASONE PROPIONATE AND SALMETEROL 113; 14 UG/1; UG/1
1 POWDER, METERED RESPIRATORY (INHALATION) 2 TIMES DAILY
Qty: 3 EACH | Refills: 1 | Status: SHIPPED | OUTPATIENT
Start: 2021-03-18 | End: 2021-04-23

## 2021-03-18 NOTE — TELEPHONE ENCOUNTER
Please see pt msg re: alternative for inhaler with better coverage.  I did ayse up the dosage- but not sure it is the equivalent. It is what the pt wrote in.  Thanks!     Kirsten Quintanilla RN

## 2021-03-31 ENCOUNTER — IMMUNIZATION (OUTPATIENT)
Dept: NURSING | Facility: CLINIC | Age: 71
End: 2021-03-31
Attending: INTERNAL MEDICINE
Payer: COMMERCIAL

## 2021-03-31 PROCEDURE — 0012A PR COVID VAC MODERNA 100 MCG/0.5 ML IM: CPT

## 2021-03-31 PROCEDURE — 91301 PR COVID VAC MODERNA 100 MCG/0.5 ML IM: CPT

## 2021-04-03 ENCOUNTER — HEALTH MAINTENANCE LETTER (OUTPATIENT)
Age: 71
End: 2021-04-03

## 2021-04-23 ENCOUNTER — TELEPHONE (OUTPATIENT)
Dept: FAMILY MEDICINE | Facility: CLINIC | Age: 71
End: 2021-04-23

## 2021-04-23 ENCOUNTER — OFFICE VISIT (OUTPATIENT)
Dept: FAMILY MEDICINE | Facility: CLINIC | Age: 71
End: 2021-04-23
Payer: COMMERCIAL

## 2021-04-23 VITALS
BODY MASS INDEX: 28.1 KG/M2 | TEMPERATURE: 97 F | WEIGHT: 212 LBS | RESPIRATION RATE: 15 BRPM | HEART RATE: 72 BPM | HEIGHT: 73 IN | SYSTOLIC BLOOD PRESSURE: 116 MMHG | DIASTOLIC BLOOD PRESSURE: 66 MMHG | OXYGEN SATURATION: 96 %

## 2021-04-23 DIAGNOSIS — L98.9 SKIN LESION: ICD-10-CM

## 2021-04-23 DIAGNOSIS — J45.30 MILD PERSISTENT ASTHMA WITHOUT COMPLICATION: Primary | ICD-10-CM

## 2021-04-23 DIAGNOSIS — I10 BENIGN ESSENTIAL HYPERTENSION: ICD-10-CM

## 2021-04-23 DIAGNOSIS — E78.5 HYPERLIPIDEMIA LDL GOAL <130: ICD-10-CM

## 2021-04-23 DIAGNOSIS — L82.0 INFLAMED SEBORRHEIC KERATOSIS: ICD-10-CM

## 2021-04-23 DIAGNOSIS — Z12.5 SCREENING FOR PROSTATE CANCER: ICD-10-CM

## 2021-04-23 DIAGNOSIS — J45.30 MILD PERSISTENT ASTHMA WITHOUT COMPLICATION: ICD-10-CM

## 2021-04-23 DIAGNOSIS — Z00.00 ENCOUNTER FOR MEDICARE ANNUAL WELLNESS EXAM: Primary | ICD-10-CM

## 2021-04-23 PROCEDURE — 99214 OFFICE O/P EST MOD 30 MIN: CPT | Mod: 25 | Performed by: FAMILY MEDICINE

## 2021-04-23 PROCEDURE — 17110 DESTRUCTION B9 LES UP TO 14: CPT | Performed by: FAMILY MEDICINE

## 2021-04-23 PROCEDURE — 99397 PER PM REEVAL EST PAT 65+ YR: CPT | Mod: 25 | Performed by: FAMILY MEDICINE

## 2021-04-23 RX ORDER — ATORVASTATIN CALCIUM 40 MG/1
40 TABLET, FILM COATED ORAL DAILY
Qty: 90 TABLET | Refills: 3 | Status: SHIPPED | OUTPATIENT
Start: 2021-04-23 | End: 2022-05-16

## 2021-04-23 RX ORDER — FLUTICASONE PROPIONATE AND SALMETEROL 113; 14 UG/1; UG/1
1 POWDER, METERED RESPIRATORY (INHALATION) 2 TIMES DAILY
Qty: 3 EACH | Refills: 3 | Status: SHIPPED | OUTPATIENT
Start: 2021-04-23 | End: 2021-04-23

## 2021-04-23 RX ORDER — LISINOPRIL 5 MG/1
5 TABLET ORAL DAILY
Qty: 90 TABLET | Refills: 3 | Status: SHIPPED | OUTPATIENT
Start: 2021-04-23 | End: 2022-05-05

## 2021-04-23 RX ORDER — ALBUTEROL SULFATE 90 UG/1
AEROSOL, METERED RESPIRATORY (INHALATION)
Qty: 18 G | Refills: 3 | Status: SHIPPED | OUTPATIENT
Start: 2021-04-23 | End: 2024-02-02

## 2021-04-23 ASSESSMENT — ENCOUNTER SYMPTOMS
PARESTHESIAS: 0
FEVER: 0
DYSURIA: 0
HEMATOCHEZIA: 0
JOINT SWELLING: 1
NERVOUS/ANXIOUS: 0
MYALGIAS: 0
PALPITATIONS: 0
ABDOMINAL PAIN: 0
HEMATURIA: 0
ARTHRALGIAS: 1
DIZZINESS: 0
SHORTNESS OF BREATH: 0
CONSTIPATION: 0
NAUSEA: 0
COUGH: 0
HEADACHES: 0
CHILLS: 0
HEARTBURN: 0
WEAKNESS: 0
DIARRHEA: 0
SORE THROAT: 0
FREQUENCY: 0
EYE PAIN: 0

## 2021-04-23 ASSESSMENT — MIFFLIN-ST. JEOR: SCORE: 1774.47

## 2021-04-23 ASSESSMENT — ACTIVITIES OF DAILY LIVING (ADL): CURRENT_FUNCTION: NO ASSISTANCE NEEDED

## 2021-04-23 NOTE — TELEPHONE ENCOUNTER
This Rx for fluticasone-salmeterol (AIRDUO RESPICLICK) 113-14 MCG/ACT inhaler cannot be ordered.  Drug not covered by ins.  Please put in a new Rx for an alternative.  Suggested alternative/s is/are: Wixelainhub, Symbicort, breoellipta or Dulera

## 2021-04-23 NOTE — PATIENT INSTRUCTIONS
Patient Education   Personalized Prevention Plan  You are due for the preventive services outlined below.  Your care team is available to assist you in scheduling these services.  If you have already completed any of these items, please share that information with your care team to update in your medical record.  Health Maintenance Due   Topic Date Due     ANNUAL REVIEW OF HM ORDERS  Never done     Asthma Action Plan - yearly  Never done     Pneumococcal Vaccine (1 of 2 - PPSV23) 12/28/2018     Pneumococcal Vaccine (1 of 2 - PPSV23) 12/28/2018     PHQ-2  01/01/2021     FALL RISK ASSESSMENT  01/14/2021     Asthma Control Test  01/21/2021     Zoster (Shingles) Vaccine (2 of 2) 10/30/2020

## 2021-04-23 NOTE — PROGRESS NOTES
"    SUBJECTIVE:   Sulaiman Marroquin is a 71 year old male who presents for Preventive Visit.      Patient has been advised of split billing requirements and indicates understanding: Yes   Are you in the first 12 months of your Medicare coverage?  No    Healthy Habits:     In general, how would you rate your overall health?  Excellent    Frequency of exercise:  None    Do you usually eat at least 4 servings of fruit and vegetables a day, include whole grains    & fiber and avoid regularly eating high fat or \"junk\" foods?  Yes    Taking medications regularly:  Yes    Medication side effects:  None    Ability to successfully perform activities of daily living:  No assistance needed    Home Safety:  Throw rugs in the hallway and lack of grab bars in the bathroom    Hearing Impairment:  No hearing concerns    In the past 6 months, have you been bothered by leaking of urine?  No    In general, how would you rate your overall mental or emotional health?  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  Semi-retired in [employer].  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  Foster kid- 34-- in [place] (refugee from [place]).  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     Here today to Follow-up on asthma.  Has been feeling well on current regimen.  Has not had any acute illness.  Has been social distancing and seeing clients via video visits.     Blood pressure check today shows he remains stable on current regimen.    Increased stress and grief lately ministering to so many in crisis through pandemic and riots and election  New skin lesion- black dot on RIGHT calf- " new  Few linette ks he would like frozen today- one in RIGHT axilla, one on LEFT posterior humerus and LEFT upper back      Do you feel safe in your environment? Yes    Have you ever done Advance Care Planning? (For example, a Health Directive, POLST, or a discussion with a medical provider or your loved ones about your wishes): Yes, advance care planning is on file.       Fall risk  Fallen 2 or more times in the past year?: No  Any fall with injury in the past year?: No    Cognitive Screening   1) Repeat 3 items (Leader, Season, Table)    2) Clock draw: NORMAL  3) 3 item recall: Recalls 3 objects  Results: 3 items recalled: COGNITIVE IMPAIRMENT LESS LIKELY    Mini-CogTM Copyright S Nora. Licensed by the author for use in [facility] [employer]; reprinted with permission (nelson@[facility]). All rights reserved.      Do you have sleep apnea, excessive snoring or daytime drowsiness?: no    Reviewed and updated as needed this visit by clinical staff  Tobacco  Allergies  Meds   Med Hx  Surg Hx  Fam Hx  Soc Hx        Reviewed and updated as needed this visit by Provider                Social History     Tobacco Use     Smoking status: Never Smoker     Smokeless tobacco: Never Used   Substance Use Topics     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      If you drink alcohol do you typically have >3 drinks per day or >7 drinks per week? Not applicable    Alcohol Use 4/23/2021   Prescreen: >3 drinks/day or >7 drinks/week? Not Applicable   Prescreen: >3 drinks/day or >7 drinks/week? -       Current providers sharing in care for this patient include:   Patient Care Team:  Thu Troncoso MD as PCP - General (Family Practice)  Thu Troncoso MD as Assigned PCP    The following health maintenance items are reviewed in Epic and correct as of today:  Health Maintenance Due   Topic Date Due     ANNUAL REVIEW OF  ORDERS  Never done     ASTHMA ACTION PLAN  Never done     Pneumococcal Vaccine:  Pediatrics (0 to 5 Years) and At-Risk Patients (6 to 64 Years) (1 of 2 - PPSV23) 12/28/2018     Pneumococcal Vaccine: 65+ Years (1 of 2 - PPSV23) 12/28/2018     FALL RISK ASSESSMENT  01/14/2021     ASTHMA CONTROL TEST  01/21/2021     ZOSTER IMMUNIZATION (2 of 2) 10/30/2020     BP Readings from Last 3 Encounters:   04/23/21 116/66   07/21/20 120/60   02/21/20 120/72    Wt Readings from Last 3 Encounters:   04/23/21 96.2 kg (212 lb)   07/21/20 91.3 kg (201 lb 3.2 oz)   02/21/20 95.7 kg (211 lb)                  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      Family History   Problem Relation Age of Onset     Alzheimer Disease Mother      Coronary Artery Disease Mother      Gastrointestinal Disease Father      Arrhythmia Father      Anxiety Disorder Father      Diabetes Paternal Grandmother      Breast Cancer Maternal Grandmother          Current Outpatient Medications   Medication Sig Dispense Refill     albuterol (PROAIR HFA/PROVENTIL HFA/VENTOLIN HFA) 108 (90 Base) MCG/ACT inhaler 1-2 puffs q4-6 hours prn SOB 1 Inhaler 3     Ascorbic Acid (VITAMIN C PO) Take 600 mg by mouth daily       atorvastatin (LIPITOR) 40 MG tablet Take 1 tablet (40 mg) by mouth daily 90 tablet 3     Calcium-Magnesium-Vitamin D (CALCIUM 500 PO) Take 1,000 mg by mouth daily       Fexofenadine HCl (WAL-FEX PO) Take by mouth daily        fluticasone-salmeterol (ADVAIR HFA) 115-21 MCG/ACT inhaler Inhale 2 puffs into the lungs 2 times daily DUE FOR APPOINTMENT AND LABS MARCH 2021-NO FURTHER REFILLS 12 g 0     fluticasone-salmeterol  "(AIRDUO RESPICLICK) 113-14 MCG/ACT inhaler Inhale 1 puff into the lungs 2 times daily 3 each 1     Lactobacillus (PROBIOTIC ACIDOPHILUS PO)        lisinopril (ZESTRIL) 5 MG tablet TAKE 1 TABLET BY MOUTH DAILY 90 tablet 0     MELATONIN PO Take 3 mg by mouth nightly as needed       Misc Natural Products (URINOZINC PO) Take by mouth daily       Multiple Vitamins-Minerals (CENTRUM SILVER 50+MEN PO) Take by mouth daily       vitamin E 400 units TABS Take 400 Units by mouth daily       Allergies   Allergen Reactions     Dogs      Dust Mites      Asa [Aspirin] Rash     Recent Labs   Lab Test 01/14/20  0840 01/31/19  0948   LDL 48 101*   HDL 40 40   TRIG 114 198*   ALT 47  --    CR 1.04 1.05   GFRESTIMATED 72 72   GFRESTBLACK 84 84   POTASSIUM 4.3 4.1       Review of Systems   Constitutional: Negative for chills and fever.   HENT: Positive for congestion. Negative for ear pain, hearing loss and sore throat.    Eyes: Negative for pain and visual disturbance.   Respiratory: Negative for cough and shortness of breath.    Cardiovascular: Negative for chest pain, palpitations and peripheral edema.   Gastrointestinal: Negative for abdominal pain, constipation, diarrhea, heartburn, hematochezia and nausea.   Genitourinary: Negative for discharge, dysuria, frequency, genital sores, hematuria, impotence and urgency.   Musculoskeletal: Positive for arthralgias and joint swelling. Negative for myalgias.   Skin: Negative for rash.   Neurological: Negative for dizziness, weakness, headaches and paresthesias.   Psychiatric/Behavioral: Negative for mood changes. The patient is not nervous/anxious.      OBJECTIVE:   /66 (BP Location: Left arm, Patient Position: Chair, Cuff Size: Adult Regular)   Pulse 72   Temp 97  F (36.1  C) (Tympanic)   Resp 15   Ht 1.861 m (6' 1.25\")   Wt 96.2 kg (212 lb)   SpO2 96%   BMI 27.78 kg/m   Estimated body mass index is 27.78 kg/m  as calculated from the following:    Height as of this encounter: " "1.861 m (6' 1.25\").    Weight as of this encounter: 96.2 kg (212 lb).  Physical Exam  GENERAL: healthy, alert and no distress  EYES: Eyes grossly normal to inspection, PERRL and conjunctivae and sclerae normal  HENT: ear canals and TM's normal, nose and mouth without ulcers or lesions  NECK: no adenopathy, no asymmetry, masses, or scars and thyroid normal to palpation  RESP: lungs clear to auscultation - no rales, rhonchi or wheezes  CV: regular rate and rhythm, normal S1 S2, no S3 or S4, no murmur, click or rub, no peripheral edema and peripheral pulses strong  ABDOMEN: soft, nontender, no hepatosplenomegaly, no masses and bowel sounds normal  MS: no gross musculoskeletal defects noted, no edema  SKIN: no suspicious lesions or rashes, one dark macular lesion 1-2 mm on posterior RIGHT calf, 3 seb ks treated in RIGHT axilla and LEFT humerus and LEFT flank with 3 3-second freeze-thaw cycles of liquid nitrogen- tolerated well.  NEURO: Normal strength and tone, mentation intact and speech normal  PSYCH: mentation appears normal, affect normal/bright    ASSESSMENT / PLAN:   1. Encounter for Medicare annual wellness exam    Fasting labs today.  Continue good diet and exercise.    2. Screening for prostate cancer    - PSA, screen; Future    3. Mild persistent asthma without complication    - fluticasone-salmeterol (AIRDUO RESPICLICK) 113-14 MCG/ACT inhaler; Inhale 1 puff into the lungs 2 times daily  Dispense: 3 each; Refill: 3  - albuterol (PROAIR HFA/PROVENTIL HFA/VENTOLIN HFA) 108 (90 Base) MCG/ACT inhaler; 1-2 puffs q4-6 hours prn SOB  Dispense: 18 g; Refill: 3    Stable on chronic regimen.    4. Benign essential hypertension    - lisinopril (ZESTRIL) 5 MG tablet; Take 1 tablet (5 mg) by mouth daily  Dispense: 90 tablet; Refill: 3  - Comprehensive metabolic panel; Future  - Albumin Random Urine Quantitative with Creat Ratio; Future    Stable on current regimen.    5. Hyperlipidemia LDL goal <130    - atorvastatin " "(LIPITOR) 40 MG tablet; Take 1 tablet (40 mg) by mouth daily  Dispense: 90 tablet; Refill: 3  - Comprehensive metabolic panel; Future  - Lipid panel reflex to direct LDL Fasting; Future    Labs today- on statin- refilled.    6. Inflamed seborrheic keratosis    - DESTRUCT BENIGN LESION, UP TO 14    Tolerated liquid nitrogen therapy today.  Follow-up in 2 weeks for retreatment prn    7. Skin lesion    - DERMATOLOGY ADULT REFERRAL; Future    Referred to DERM for skin check and eval of this new spot on calf.    Patient has been advised of split billing requirements and indicates understanding: Yes  COUNSELING:  Reviewed preventive health counseling, as reflected in patient instructions       Regular exercise       Healthy diet/nutrition    Estimated body mass index is 27.78 kg/m  as calculated from the following:    Height as of this encounter: 1.861 m (6' 1.25\").    Weight as of this encounter: 96.2 kg (212 lb).    Weight management plan: Discussed healthy diet and exercise guidelines    He reports that he has never smoked. He has never used smokeless tobacco.      Appropriate preventive services were discussed with this patient, including applicable screening as appropriate for cardiovascular disease, diabetes, osteopenia/osteoporosis, and glaucoma.  As appropriate for age/gender, discussed screening for colorectal cancer, prostate cancer, breast cancer, and cervical cancer. Checklist reviewing preventive services available has been given to the patient.    Reviewed patients plan of care and provided an AVS. The Basic Care Plan (routine screening as documented in Health Maintenance) for Sulaiman meets the Care Plan requirement. This Care Plan has been established and reviewed with the Patient.    Counseling Resources:  ATP IV Guidelines  Pooled Cohorts Equation Calculator  Breast Cancer Risk Calculator  Breast Cancer: Medication to Reduce Risk  FRAX Risk Assessment  ICSI Preventive Guidelines  Dietary Guidelines for " Americans, 2010  USDA's MyPlate  ASA Prophylaxis  Lung CA Screening    Thu Troncoso MD  [facility]    Identified Health Risks:

## 2021-04-24 ASSESSMENT — ASTHMA QUESTIONNAIRES: ACT_TOTALSCORE: 25

## 2021-07-07 ENCOUNTER — OFFICE VISIT (OUTPATIENT)
Dept: DERMATOLOGY | Facility: CLINIC | Age: 71
End: 2021-07-07
Payer: COMMERCIAL

## 2021-07-07 DIAGNOSIS — L98.9 SKIN LESION: ICD-10-CM

## 2021-07-07 DIAGNOSIS — D48.9 NEOPLASM OF UNCERTAIN BEHAVIOR: Primary | ICD-10-CM

## 2021-07-07 DIAGNOSIS — D18.01 CHERRY ANGIOMA: ICD-10-CM

## 2021-07-07 DIAGNOSIS — L82.1 SEBORRHEIC KERATOSIS: ICD-10-CM

## 2021-07-07 PROCEDURE — 99202 OFFICE O/P NEW SF 15 MIN: CPT | Mod: 25 | Performed by: DERMATOLOGY

## 2021-07-07 PROCEDURE — 88305 TISSUE EXAM BY PATHOLOGIST: CPT | Performed by: DERMATOLOGY

## 2021-07-07 PROCEDURE — 11102 TANGNTL BX SKIN SINGLE LES: CPT | Mod: GC | Performed by: DERMATOLOGY

## 2021-07-07 ASSESSMENT — PAIN SCALES - GENERAL: PAINLEVEL: NO PAIN (0)

## 2021-07-07 NOTE — PROGRESS NOTES
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   - Shave biopsy performed today (see procedure note(s) below).    # Ocular nevus.  Per patient, has been present and stable since adolescence, has been evaluated by optometry. Advised continued monitoring and to notify ophtho for changes.    # Benign lesions: SKs, cherry angiomas. No treatment required.    # Solar lentigenes.  - Photoprotection advised - recommend SPF 30 or higher with frequent application    Procedures Performed:   - Shave biopsy procedure note, location(s): Right posterior calf. After discussion of benefits and risks including but not limited to bleeding, infection, scar, incomplete removal, recurrence, and non-diagnostic biopsy, written consent and photographs were obtained. The area was cleaned with isopropyl alcohol. 0.5mL of 1% lidocaine with epinephrine was injected to obtain adequate anesthesia of lesion(s). Shave biopsy at site(s) performed. Hemostasis was achieved with aluminium chloride. Petrolatum ointment and a sterile dressing were applied. The patient tolerated the procedure and no complications were noted. The patient was provided with verbal and written post care instructions.     Follow-up: 1 year, sooner if concerns.     Staff, Resident, and Scribe:      Staff Physician Comments:   I saw and evaluated the patient with the resident and I agree with the assessment and plan.  I was present for the key portions of the above major procedure and  examination..    Lisa Gutierrez MD    Department of Dermatology  [facility]: Phone: [phone], Fax: [phone]  7/15/2021       Hemanth Ramírez MD  Med-Peds PGY2  [facility]    Scribe Disclosure:  IPalma, am serving as a scribe to document services personally performed by Lisa Gutierrez MD at this visit, based upon the provider's statements to me. All documentation has been reviewed by the aforementioned provider prior to being entered into the official medical record.     Palam MEHTA, a scribe, prepared the chart for today's encounter.   ____________________________________________    CC: Skin Check (carlito is coming in today for a skin check, states that he has a spot of concern on his lower right calf)      HPI:  Mr. Sulaiman Marroquin is a(n) 71 year old male who presents today as a new patient. The patient was referred to the clinic by Dr. Troncoso, of primary care, for evaluation of a skin lesion and a routine check.     He has history of nevi and seborrheic keratosis but has not been seen by dermatology in the past.  His primary care doctor has frozen off multiple seborrheic keratoses in the past, including his recent Medicare wellness visit.  That time she had concern for a dark lesion on his posterior right calf leading her to refer him to Derm clinic for investigation of this lesion and a skin check.    He reports that his brother had some form of skin cancer removed from his face some years ago there is no sure exactly what kind.  He believes that his brother did not require further treatment after removal of the lesion.  No other known dermatologic history in his family.    Patient is otherwise feeling well, without additional skin concerns.    Labs Reviewed:  N/A    Physical Exam:  Vitals: There were no vitals taken for this visit.  SKIN: Total skin excluding the  undergarment areas was performed. The exam included the head/face, neck, both arms, chest, back, abdomen, both legs, digits and/or nails.   - 2-3 mm dark brown lesion over sclera of right eye, just lateral to iris at 9 o'clock position  -There is a 3 mm round, well-circumscribed dark brown lesion located on the right posterior calf.   - Numerous regular brown pigmented macules and papules are identified on the trunk and exremiites.   There are waxy stuck on tan to brown papules on the trunk and extremities..   - There are dome shaped bright red papules on the anterior and posterior trunk.   - Scattered brown macules on sun exposed areas..   - No other lesions of concern on areas examined.     Medications:  Current Outpatient Medications   Medication     albuterol (PROAIR HFA/PROVENTIL HFA/VENTOLIN HFA) 108 (90 Base) MCG/ACT inhaler     Ascorbic Acid (VITAMIN C PO)     atorvastatin (LIPITOR) 40 MG tablet     Calcium-Magnesium-Vitamin D (CALCIUM 500 PO)     Fexofenadine HCl (WAL-FEX PO)     fluticasone-vilanterol (BREO ELLIPTA) 100-25 MCG/INH inhaler     Lactobacillus (PROBIOTIC ACIDOPHILUS PO)     lisinopril (ZESTRIL) 5 MG tablet     MELATONIN PO     Misc Natural Products (URINOZINC PO)     Multiple Vitamins-Minerals (CENTRUM SILVER 50+MEN PO)     vitamin E 400 units TABS     No current facility-administered medications for this visit.        Past Medical History:   Patient Active Problem List   Diagnosis     Hyperlipidemia LDL goal <130     Benign essential hypertension     Aneurysm of right renal artery (H)     Mild persistent asthma without complication     Past Medical History:   Diagnosis Date     Aneurysm of right renal artery (H) 2015     Benign essential hypertension 1/14/2020     Chronic back pain 2007    currently seeing chiropractor at [facility] with Dr. Connell.      Uncomplicated asthma         CC Thu Troncoso MD  [address] [place] [address] on close of this  encounter.

## 2021-07-07 NOTE — PROGRESS NOTES
Lidocaine-epinephrine 1-1:436605 % injection   0.5mL once for one use, starting 7/7/2021 ending 7/7/2021,  2mL disp, R-0, injection  Injected by Randi Thompson

## 2021-07-07 NOTE — LETTER
7/7/2021       RE: Sulaiman Marroquin  [address]  [facility] [address]     Dear Colleague,    Thank you for referring your patient, Sulaiman Marroquin, to the [facility] [place] [facility]. Please see a copy of my visit note below.    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   - Shave biopsy performed today (see procedure note(s) below).    # Ocular nevus.  Per patient, has been present and stable since adolescence, has been evaluated by optometry. Advised continued monitoring and to notify ophtho for changes.    # Benign lesions: SKs, cherry angiomas. No treatment required.    # Solar lentigenes.  - Photoprotection advised - recommend SPF 30 or higher with frequent application    Procedures Performed:   - Shave biopsy procedure note, location(s): Right posterior calf. After discussion of benefits and risks including but not limited to bleeding, infection, scar, incomplete removal, recurrence, and non-diagnostic biopsy, written consent and photographs were obtained. The area was cleaned with isopropyl alcohol. 0.5mL of 1% lidocaine with epinephrine was injected to obtain adequate anesthesia of lesion(s). Shave biopsy at site(s) performed. Hemostasis was achieved with aluminium chloride. Petrolatum ointment and a sterile dressing were applied. The patient tolerated the procedure and no complications were noted. The patient was provided with  verbal and written post care instructions.     Follow-up: 1 year, sooner if concerns.     Staff, Resident, and Scribe:      Staff Physician Comments:   I saw and evaluated the patient with the resident and I agree with the assessment and plan.  I was present for the key portions of the above major procedure and examination..    Lisa Gutierrez MD    Department of Dermatology  [facility]: Phone: [phone], Fax: [phone]  7/15/2021       Hemanth Ramírez MD  Med-Peds PGY2  [facility]    Scribe Disclosure:  I, Palma Roman, am serving as a scribe to document services personally performed by Lisa Gutierrez MD at this visit, based upon the provider's statements to me. All documentation has been reviewed by the aforementioned provider prior to being entered into the official medical record.     I, Palma Roman, a scribe, prepared the chart for today's encounter.   ____________________________________________    CC: Skin Check (carlito is coming in today for a skin check, states that he has a spot of concern on his lower right calf)      HPI:  Mr. Sulaiman Marroquin is a(n) 71 year old male who presents today as a new patient. The patient was referred to the clinic by Dr. Troncoso, of primary care, for evaluation of a skin lesion and a routine check.     He has history of nevi and seborrheic keratosis but has not been seen by dermatology in the past.  His primary care doctor has frozen off multiple seborrheic keratoses in the past, including his recent Medicare wellness visit.  That time she had concern for a dark lesion on his posterior right calf leading her to refer him to Derm clinic for investigation of this lesion and a skin check.    He reports that his brother had some form of skin cancer removed from his face some years ago there is no sure exactly what kind.  He believes that his brother did  not require further treatment after removal of the lesion.  No other known dermatologic history in his family.    Patient is otherwise feeling well, without additional skin concerns.    Labs Reviewed:  N/A    Physical Exam:  Vitals: There were no vitals taken for this visit.  SKIN: Total skin excluding the undergarment areas was performed. The exam included the head/face, neck, both arms, chest, back, abdomen, both legs, digits and/or nails.   - 2-3 mm dark brown lesion over sclera of right eye, just lateral to iris at 9 o'clock position  -There is a 3 mm round, well-circumscribed dark brown lesion located on the right posterior calf.   - Numerous regular brown pigmented macules and papules are identified on the trunk and exremiites.   There are waxy stuck on tan to brown papules on the trunk and extremities..   - There are dome shaped bright red papules on the anterior and posterior trunk.   - Scattered brown macules on sun exposed areas..   - No other lesions of concern on areas examined.     Medications:  Current Outpatient Medications   Medication     albuterol (PROAIR HFA/PROVENTIL HFA/VENTOLIN HFA) 108 (90 Base) MCG/ACT inhaler     Ascorbic Acid (VITAMIN C PO)     atorvastatin (LIPITOR) 40 MG tablet     Calcium-Magnesium-Vitamin D (CALCIUM 500 PO)     Fexofenadine HCl (WAL-FEX PO)     fluticasone-vilanterol (BREO ELLIPTA) 100-25 MCG/INH inhaler     Lactobacillus (PROBIOTIC ACIDOPHILUS PO)     lisinopril (ZESTRIL) 5 MG tablet     MELATONIN PO     Misc Natural Products (URINOZINC PO)     Multiple Vitamins-Minerals (CENTRUM SILVER 50+MEN PO)     vitamin E 400 units TABS     No current facility-administered medications for this visit.        Past Medical History:   Patient Active Problem List   Diagnosis     Hyperlipidemia LDL goal <130     Benign essential hypertension     Aneurysm of right renal artery (H)     Mild persistent asthma without complication     Past Medical History:   Diagnosis Date     Aneurysm of  right renal artery (H) 2015     Benign essential hypertension 1/14/2020     Chronic back pain 2007    currently seeing chiropractor at [facility] with Dr. Connell.      Uncomplicated asthma         CC Thu Troncoso MD  [address] [place] [address] on close of this encounter.      Lidocaine-epinephrine 1-1:919183 % injection   0.5mL once for one use, starting 7/7/2021 ending 7/7/2021,  2mL disp, R-0, injection  Injected by Randi Thompson

## 2021-07-07 NOTE — NURSING NOTE
Dermatology Rooming Note    Sulaiman Marroquin's goals for this visit include:   Chief Complaint   Patient presents with     Skin Check     carlito is coming in today for a skin check, states that he has a spot of concern on his lower right calf     Randi Thompson CMA on 7/7/2021 at 8:12 AM

## 2021-07-07 NOTE — PATIENT INSTRUCTIONS
Wound Care After a Biopsy    What is a skin biopsy?  A skin biopsy allows the doctor to examine a very small piece of tissue under the microscope to determine the diagnosis and the best treatment for the skin condition. A local anesthetic (numbing medicine)  is injected with a very small needle into the skin area to be tested. A small piece of skin is taken from the area. Sometimes a suture (stitch) is used.     What are the risks of a skin biopsy?  I will experience scar, bleeding, swelling, pain, crusting and redness. I may experience incomplete removal or recurrence. Risks of this procedure are excessive bleeding, bruising, infection, nerve damage, numbness, thick (hypertrophic or keloidal) scar and non-diagnostic biopsy.    How should I care for my wound for the first 24 hours?    Keep the wound dry and covered for 24 hours    If it bleeds, hold direct pressure on the area for 15 minutes. If bleeding does not stop then go to the emergency room    Avoid strenuous exercise the first 1-2 days or as your doctor instructs you    How should I care for the wound after 24 hours?    After 24 hours, remove the bandage    You may bathe or shower as normal    If you had a scalp biopsy, you can shampoo as usual and can use shower water to clean the biopsy site daily    Clean the wound twice a day with gentle soap and water    Do not scrub, be gentle    Apply white petroleum/Vaseline after cleaning the wound with a cotton swab or a clean finger, and keep the site covered with a Bandaid /bandage. Bandages are not necessary with a scalp biopsy    If you are unable to cover the site with a Bandaid /bandage, re-apply ointment 2-3 times a day to keep the site moist. Moisture will help with healing    Avoid strenuous activity for first 1-2 days    Avoid lakes, rivers, pools, and oceans until the stitches are removed or the site is healed    How do I clean my wound?    Wash hands thoroughly with soap or use hand  before all  wound care    Clean the wound with gentle soap and water    Apply white petroleum/Vaseline  to wound after it is clean    Replace the Bandaid /bandage to keep the wound covered for the first few days or as instructed by your doctor    If you had a scalp biopsy, warm shower water to the area on a daily basis should suffice    What should I use to clean my wound?     Cotton-tipped applicators (Qtips )    White petroleum jelly (Vaseline ). Use a clean new container and use Q-tips to apply.    Bandaids   as needed    Gentle soap     How should I care for my wound long term?    Do not get your wound dirty    Keep up with wound care for one week or until the area is healed.    A small scab will form and fall off by itself when the area is completely healed. The area will be red and will become pink in color as it heals. Sun protection is very important for how your scar will turn out. Sunscreen with an SPF 30 or greater is recommended once the area is healed.    If you have stitches, stitches need to be removed in 14 days. You may return to our clinic for this or you may have it done locally at your doctor s office.    You should have some soreness but it should be mild and slowly go away over several days. Talk to your doctor about using tylenol for pain,    When should I call my doctor?  If you have increased:     Pain or swelling    Pus or drainage (clear or slightly yellow drainage is ok)    Temperature over 100F    Spreading redness or warmth around wound    When will I hear about my results?  The biopsy results can take 2-3 weeks to come back. The clinic will call you with the results, send you a [employer] message, or have you schedule a follow-up clinic or phone time to discuss the results. Contact our clinics if you do not hear from us in 3 weeks.     Who should I call with questions?    [facility]: [phone]     [facility]: [phone]    For  urgent needs outside of business hours call the [facility] at [phone] and ask for the dermatology resident on call      Patient Education     Checking for Skin Cancer  You can find cancer early by checking your skin each month. There are 3 kinds of skin cancer. They are melanoma, basal cell carcinoma, and squamous cell carcinoma. Doing monthly skin checks is the best way to find new marks or skin changes. Follow the instructions below for checking your skin.   The ABCDEs of checking moles for melanoma   Check your moles or growths for signs of melanoma using ABCDE:     Asymmetry: the sides of the mole or growth don t match    Border: the edges are ragged, notched, or blurred    Color: the color within the mole or growth varies    Diameter: the mole or growth is larger than 6 mm (size of a pencil eraser)    Evolving: the size, shape, or color of the mole or growth is changing (evolving is not shown in the images below)    Checking for other types of skin cancer  Basal cell carcinoma or squamous cell carcinoma have symptoms such as:       A spot or mole that looks different from all other marks on your skin    Changes in how an area feels, such as itching, tenderness, or pain    Changes in the skin's surface, such as oozing, bleeding, or scaliness    A sore that does not heal    New swelling or redness beyond the border of a mole    Who s at risk?  Anyone can get skin cancer. But you are at greater risk if you have:     Fair skin, light-colored hair, or light-colored eyes    Many moles or abnormal moles on your skin    A history of sunburns from sunlight or tanning beds    A family history of skin cancer    A history of exposure to radiation or chemicals    A weakened immune system  If you have had skin cancer in the past, you are at risk for recurring skin cancer.   How to check your skin  Do your monthly skin checkups in front of a full-length mirror. Check all parts of your body, including your:     Head  (ears, face, neck, and scalp)    Torso (front, back, and sides)    Arms (tops, undersides, upper, and lower armpits)    Hands (palms, backs, and fingers, including under the nails)    Buttocks and genitals    Legs (front, back, and sides)    Feet (tops, soles, toes, including under the nails, and between toes)  If you have a lot of moles, take digital photos of them each month. Make sure to take photos both up close and from a distance. These can help you see if any moles change over time.   Most skin changes are not cancer. But if you see any changes in your skin, call your doctor right away. Only he or she can diagnose a problem. If you have skin cancer, seeing your doctor can be the first step toward getting the treatment that could save your life.   [employer] last reviewed this educational content on 4/1/2019 2000-2020 [employer]. [address] [place] [address]. All rights reserved. This information is not intended as a substitute for professional medical care. Always follow your healthcare professional's instructions.       When should I call my doctor?    If you are worsening or not improving, please, contact us or seek urgent care as noted below.     Who should I call with questions (adults)?    [facility] (adult and pediatric): [phone]     [facility] (adult): [phone]    For urgent needs outside of business hours call the [facility] at [phone] and ask for the dermatology resident on call    If this is a medical emergency and you are unable to reach an ER, Call [phone]      Who should I call with questions (pediatric)?  [facility]- Pediatric Dermatology  Dr. Lisa Christian, Dr. Alok Austin, Dr. Lucila Segura, Viola Rubio, PA  Dr. Mireille Escoto, Dr. Swati Leal & Dr. Nilay Hernandez  Non Urgent  Nurse Triage Line; [phone]- Marlen and Veronica RICE Care Coordinators    Amira (/Complex ) [phone]    If you need a prescription refill, please contact your pharmacy. Refills are approved or denied by our Physicians during normal business hours, Monday through Fridays  Per office policy, refills will not be granted if you have not been seen within the past year (or sooner depending on your child's condition)    Scheduling Information:  Pediatric Appointment Scheduling and Call Center [phone]  Radiology Scheduling- [phone]  Sedation Unit Scheduling- [phone]  [place] Scheduling- [facility] [phone]; Pediatric Dermatology [phone]  Main  Services: [phone]  Icelandic: [phone]  Uzbek: [phone]  Hmong/Moe/Citizen of [place]: [phone]  Preadmission Nursing Department Fax Number: [phone] (Fax all pre-operative paperwork to this number)    For urgent matters arising during evenings, weekends, or holidays that cannot wait for normal business hours please call [phone] and ask for the Dermatology Resident On-Call to be paged.

## 2021-07-08 LAB — COPATH REPORT: NORMAL

## 2021-08-09 ENCOUNTER — NURSE TRIAGE (OUTPATIENT)
Dept: NURSING | Facility: CLINIC | Age: 71
End: 2021-08-09

## 2021-08-09 NOTE — TELEPHONE ENCOUNTER
Tank reports that he was potentially exposed to COVID. They were in [place] with his son until 8/6. His son developed COVID like symptoms the same day.  Son has tested, with COVID results pending.    He would like to get tested.  Caller is asymptomatic. Fully vaccinated.    COVID 19 Nurse Triage Plan/Patient Instructions    Please be aware that novel coronavirus (COVID-19) may be circulating in the community. If you develop symptoms such as fever, cough, or SOB or if you have concerns about the presence of another infection including coronavirus (COVID-19), please contact your health care provider or visit [email or web address].     Disposition/Instructions    Virtual Visit with provider recommended. Reference Visit Selection Guide. Pt prefers testing through MD.    Thank you for taking steps to prevent the spread of this virus.  o Limit your contact with others.  o Wear a simple mask to cover your cough.  o Wash your hands well and often.    Resources    [facility]: About COVID-19: [email or web address]    CDC: What to Do If You're Sick: www.cdc.gov/coronavirus/2019-ncov/about/steps-when-sick.html    CDC: Ending Home Isolation: www.cdc.gov/coronavirus/2019-ncov/hcp/disposition-in-home-patients.html     CDC: Caring for Someone: www.cdc.gov/coronavirus/2019-ncov/if-you-are-sick/care-for-someone.html     [facility]: Interim Guidance for Hospital Discharge to Home: www.health.[facility].mn.us/diseases/coronavirus/hcp/hospdischarge.pdf    [facility] clinical trials (COVID-19 research studies): clinicalaffairs.[facility]/umn-clinical-trials     Below are the COVID-19 hotlines at the [facility] ([facility]). Interpreters are available.   o For health questions: Call 785-736-7496 or 1-511.202.5892 (7 a.m. to 7 p.m.)  o For questions about schools and childcare: Call 705-912-0310 or 1-806.710.7719 (7 a.m. to 7 p.m.)       Flakita Fowler RN/[facility] Nurse  Advisor                  Reason for Disposition    [1] CLOSE CONTACT COVID-19 EXPOSURE within last 14 days AND [2] needs COVID-19 lab test to return to work AND [3] NO symptoms    Protocols used: CORONAVIRUS (COVID-19) EXPOSURE-A- 3.25

## 2021-09-12 ENCOUNTER — HEALTH MAINTENANCE LETTER (OUTPATIENT)
Age: 71
End: 2021-09-12

## 2021-12-02 ENCOUNTER — OFFICE VISIT (OUTPATIENT)
Dept: FAMILY MEDICINE | Facility: CLINIC | Age: 71
End: 2021-12-02
Payer: COMMERCIAL

## 2021-12-02 VITALS
HEART RATE: 69 BPM | WEIGHT: 214 LBS | DIASTOLIC BLOOD PRESSURE: 76 MMHG | TEMPERATURE: 97.9 F | BODY MASS INDEX: 28.36 KG/M2 | HEIGHT: 73 IN | OXYGEN SATURATION: 96 % | RESPIRATION RATE: 16 BRPM | SYSTOLIC BLOOD PRESSURE: 132 MMHG

## 2021-12-02 DIAGNOSIS — J45.30 MILD PERSISTENT ASTHMA WITHOUT COMPLICATION: ICD-10-CM

## 2021-12-02 DIAGNOSIS — I72.2 ANEURYSM OF RIGHT RENAL ARTERY (H): Primary | ICD-10-CM

## 2021-12-02 DIAGNOSIS — I10 BENIGN ESSENTIAL HYPERTENSION: ICD-10-CM

## 2021-12-02 DIAGNOSIS — E78.5 HYPERLIPIDEMIA LDL GOAL <130: ICD-10-CM

## 2021-12-02 DIAGNOSIS — R25.2 LEG CRAMPS: ICD-10-CM

## 2021-12-02 PROCEDURE — 99214 OFFICE O/P EST MOD 30 MIN: CPT | Performed by: FAMILY MEDICINE

## 2021-12-02 PROCEDURE — 80061 LIPID PANEL: CPT | Performed by: FAMILY MEDICINE

## 2021-12-02 PROCEDURE — 82043 UR ALBUMIN QUANTITATIVE: CPT | Performed by: FAMILY MEDICINE

## 2021-12-02 PROCEDURE — 80053 COMPREHEN METABOLIC PANEL: CPT | Performed by: FAMILY MEDICINE

## 2021-12-02 PROCEDURE — 83735 ASSAY OF MAGNESIUM: CPT | Performed by: FAMILY MEDICINE

## 2021-12-02 PROCEDURE — 36415 COLL VENOUS BLD VENIPUNCTURE: CPT | Performed by: FAMILY MEDICINE

## 2021-12-02 ASSESSMENT — MIFFLIN-ST. JEOR: SCORE: 1783.54

## 2021-12-02 NOTE — PROGRESS NOTES
"  Assessment & Plan     (I72.2) Aneurysm of right renal artery (H)  (primary encounter diagnosis)  Comment: stable, repeat CTA 1/2023.    (E78.5) Hyperlipidemia LDL goal <130  Comment:   LDL Cholesterol Calculated   Date Value Ref Range Status   01/14/2020 48 <100 mg/dL Final     Comment:     Desirable:       <100 mg/dl      Plan: Lipid panel reflex to direct LDL Fasting,         Comprehensive metabolic panel            (I10) Benign essential hypertension  Comment:   BP Readings from Last 3 Encounters:   12/02/21 132/76   04/23/21 116/66   07/21/20 120/60    At goal  The current medical regimen is effective;  continue present plan and medications.    Plan: Albumin Random Urine Quantitative with Creat         Ratio, Comprehensive metabolic panel            (J45.30) Mild persistent asthma without complication  Comment: stable, at goal      (R25.2) Leg cramps  Comment: new problem, recommended taking magnesium at night and getting regular exercise during the day.  Will check magnesium and potassium levels today  Plan: Magnesium        Will fu as indicated.     }     BMI:   Estimated body mass index is 28.04 kg/m  as calculated from the following:    Height as of this encounter: 1.861 m (6' 1.25\").    Weight as of this encounter: 97.1 kg (214 lb).       Return in about 5 months (around 5/2/2022) for preventive visit (wellness/annual physical exam).    Vaishali Car MD  [facility]    Eddie Fu is a 71 year old who presents for the following health issues     Hyperlipidemia    History of Present Illness       Hyperlipidemia:  He presents for follow up of hyperlipidemia.  He is taking medication to lower cholesterol. He is not having myalgia or other side effects to statin medications.    he does report intermittent painful leg cramps.    Hypertension Follow-up      Do you check your blood pressure regularly outside of the clinic? No     Are you following a low salt diet? " No    Are your blood pressures ever more than 140 on the top number (systolic) OR more   than 90 on the bottom number (diastolic), for example 140/90? Not checking    Asthma Follow-Up    Was ACT completed today?    Yes    ACT Total Scores 12/2/2021   ACT TOTAL SCORE (Goal Greater than or Equal to 20) 25   In the past 12 months, how many times did you visit the emergency room for your asthma without being admitted to the hospital? 0   In the past 12 months, how many times were you hospitalized overnight because of your asthma? 0          How many days per week do you miss taking your asthma controller medication?  1    Please describe any recent triggers for your asthma: None    Have you had any Emergency Room Visits, Urgent Care Visits, or Hospital Admissions since your last office visit?  No      How many servings of fruits and vegetables do you eat daily?  0-1    On average, how many sweetened beverages do you drink each day (Examples: soda, juice, sweet tea, etc.  Do NOT count diet or artificially sweetened beverages)?   0    How many days per week do you exercise enough to make your heart beat faster? 7    How many minutes a day do you exercise enough to make your heart beat faster? 60 or more  How many days per week do you miss taking your medication? 1    What makes it hard for you to take your medications?  remembering to take    Vascular Disease Follow-up; right renal artery aneurysm      How often do you take nitroglycerin? Never    Do you take an aspirin every day? No     No symptoms     Last CTA showed no interval diameter change from 10/28/2016 to 1/28/2020      Review of Systems   Constitutional, HEENT, cardiovascular, pulmonary, GI, , musculoskeletal, neuro, skin, endocrine and psych systems are negative, except as otherwise noted.      Objective    /76 (BP Location: Left arm, Patient Position: Sitting, Cuff Size: Adult Regular)   Pulse 69   Temp 97.9  F (36.6  C) (Temporal)   Resp 16   Ht  "1.861 m (6' 1.25\")   Wt 97.1 kg (214 lb)   SpO2 96%   BMI 28.04 kg/m    Body mass index is 28.04 kg/m .  Physical Exam   GENERAL: healthy, alert and no distress  NECK: no adenopathy, no asymmetry, masses, or scars, thyroid normal to palpation and no carotid bruits  RESP: lungs clear to auscultation - no rales, rhonchi or wheezes  CV: regular rate and rhythm, normal S1 S2, no S3 or S4, no murmur, click or rub, no peripheral edema and peripheral pulses strong  MS: no gross musculoskeletal defects noted, no edema  SKIN: no suspicious lesions or rashes  NEURO: Normal strength and tone, mentation intact and speech normal  PSYCH: mentation appears normal, affect normal/bright                "

## 2021-12-03 LAB
ALBUMIN SERPL-MCNC: 3.5 G/DL (ref 3.4–5)
ALP SERPL-CCNC: 54 U/L (ref 40–150)
ALT SERPL W P-5'-P-CCNC: 39 U/L (ref 0–70)
ANION GAP SERPL CALCULATED.3IONS-SCNC: 8 MMOL/L (ref 3–14)
AST SERPL W P-5'-P-CCNC: 27 U/L (ref 0–45)
BILIRUB SERPL-MCNC: 0.6 MG/DL (ref 0.2–1.3)
BUN SERPL-MCNC: 23 MG/DL (ref 7–30)
CALCIUM SERPL-MCNC: 8.9 MG/DL (ref 8.5–10.1)
CHLORIDE BLD-SCNC: 109 MMOL/L (ref 94–109)
CHOLEST SERPL-MCNC: 127 MG/DL
CO2 SERPL-SCNC: 24 MMOL/L (ref 20–32)
CREAT SERPL-MCNC: 1.29 MG/DL (ref 0.66–1.25)
FASTING STATUS PATIENT QL REPORTED: YES
GFR SERPL CREATININE-BSD FRML MDRD: 55 ML/MIN/1.73M2
GLUCOSE BLD-MCNC: 100 MG/DL (ref 70–99)
HDLC SERPL-MCNC: 45 MG/DL
LDLC SERPL CALC-MCNC: 66 MG/DL
MAGNESIUM SERPL-MCNC: 2.5 MG/DL (ref 1.6–2.3)
NONHDLC SERPL-MCNC: 82 MG/DL
POTASSIUM BLD-SCNC: 4.3 MMOL/L (ref 3.4–5.3)
PROT SERPL-MCNC: 7.7 G/DL (ref 6.8–8.8)
SODIUM SERPL-SCNC: 141 MMOL/L (ref 133–144)
TRIGL SERPL-MCNC: 78 MG/DL

## 2021-12-03 ASSESSMENT — ASTHMA QUESTIONNAIRES: ACT_TOTALSCORE: 25

## 2021-12-04 LAB
CREAT UR-MCNC: 280 MG/DL
MICROALBUMIN UR-MCNC: 24 MG/L
MICROALBUMIN/CREAT UR: 8.57 MG/G CR (ref 0–17)

## 2021-12-07 NOTE — RESULT ENCOUNTER NOTE
Thank you for getting labs done.     Your microalbumen is normal, which is great news. This means you do not have protein in the urine, and that your kidneys are currently functioning well. Keeping blood pressure and blood fats low is the best way to preserve your kidney function over your lifetime.     Good news, your LDL (or bad cholesterol) is at goal. Your medication is working well. Please continue to take your cholesterol lowering medication as you have been doing.    The testing of your blood sugar, liver function and electrolytes was normal.  Kidney function is assessed by blood work that measures creatinine and calculates estimated GFR (glomerular filtration rate).  By current criteria you do have stage 3 chronic kidney disease as your eGFR is < 60.  This is not something that is urgent as your value has not changed much with time.    However, it does mean will monitor your labs (urine and blood tests) every 6-12 months and we will keep trying to make sure your blood pressure and cholesterol are at goal to keep your kidneys as healthy as possible.     Your magnesium is actually a little high!  This isn't a health problem, but in this case I recommend you not take extra magnesium.    Sincerely,  Dr. Vaishali Car MD  12/7/2021

## 2022-03-11 ENCOUNTER — MYC MEDICAL ADVICE (OUTPATIENT)
Dept: FAMILY MEDICINE | Facility: CLINIC | Age: 72
End: 2022-03-11
Payer: COMMERCIAL

## 2022-03-11 DIAGNOSIS — Z12.11 SCREEN FOR COLON CANCER: Primary | ICD-10-CM

## 2022-03-21 LAB — COLOGUARD-ABSTRACT: NEGATIVE

## 2022-03-25 NOTE — RESULT ENCOUNTER NOTE
Great news!  The results of your recent Cologuard stool testing was normal (negative for colon cancer). Congratulations, you do not have colon cancer!      Please repeat this test every 3 years. Certainly if you were to ever develop symptoms that are concerning such as stool changes, change in color of your stool, blood in your stool, very thin stools, or significant weight loss, please let me know.    If you have any questions or concerns, please call the clinic.    Thank you very much for completing the test!      Sincerely,    Vaishali Car MD

## 2022-04-09 ENCOUNTER — PATIENT OUTREACH (OUTPATIENT)
Dept: DERMATOLOGY | Facility: CLINIC | Age: 72
End: 2022-04-09
Payer: COMMERCIAL

## 2022-04-09 NOTE — CONFIDENTIAL NOTE
Attempted to reach patient to schedule follow up in the Dermatology Clinic.  No answer,  LM on VM to call office and D[employer]le Mobile message sent.    Schedule with Dr. Lisa Gutierrez.

## 2022-05-03 DIAGNOSIS — J45.30 MILD PERSISTENT ASTHMA WITHOUT COMPLICATION: ICD-10-CM

## 2022-05-03 DIAGNOSIS — I10 BENIGN ESSENTIAL HYPERTENSION: ICD-10-CM

## 2022-05-05 RX ORDER — LISINOPRIL 5 MG/1
TABLET ORAL
Qty: 90 TABLET | Refills: 3 | Status: SHIPPED | OUTPATIENT
Start: 2022-05-05 | End: 2023-05-16

## 2022-05-05 NOTE — TELEPHONE ENCOUNTER
Routing refill request to provider for review/approval because:  Labs out of range:  Creatinine    Creatinine   Date Value Ref Range Status   12/02/2021 1.29 (H) 0.66 - 1.25 mg/dL Final   01/14/2020 1.04 0.66 - 1.25 mg/dL Final     Jewell Souza RN  [facility]

## 2022-05-14 DIAGNOSIS — E78.5 HYPERLIPIDEMIA LDL GOAL <130: ICD-10-CM

## 2022-05-16 RX ORDER — ATORVASTATIN CALCIUM 40 MG/1
TABLET, FILM COATED ORAL
Qty: 90 TABLET | Refills: 1 | Status: SHIPPED | OUTPATIENT
Start: 2022-05-16 | End: 2023-01-19

## 2022-06-19 ENCOUNTER — HEALTH MAINTENANCE LETTER (OUTPATIENT)
Age: 72
End: 2022-06-19

## 2022-07-20 ENCOUNTER — PATIENT OUTREACH (OUTPATIENT)
Dept: DERMATOLOGY | Facility: CLINIC | Age: 72
End: 2022-07-20

## 2022-07-20 ENCOUNTER — OFFICE VISIT (OUTPATIENT)
Dept: DERMATOLOGY | Facility: CLINIC | Age: 72
End: 2022-07-20
Payer: COMMERCIAL

## 2022-07-20 DIAGNOSIS — L82.1 SEBORRHEIC KERATOSIS: Primary | ICD-10-CM

## 2022-07-20 DIAGNOSIS — D36.10 NEUROFIBROMA: ICD-10-CM

## 2022-07-20 DIAGNOSIS — D22.9 MULTIPLE BENIGN NEVI: ICD-10-CM

## 2022-07-20 DIAGNOSIS — D49.2 NEOPLASM OF UNSPECIFIED BEHAVIOR OF BONE, SOFT TISSUE, AND SKIN: ICD-10-CM

## 2022-07-20 DIAGNOSIS — D18.01 CHERRY ANGIOMA: ICD-10-CM

## 2022-07-20 DIAGNOSIS — B35.3 TINEA PEDIS OF BOTH FEET: ICD-10-CM

## 2022-07-20 DIAGNOSIS — B35.1 ONYCHOMYCOSIS: ICD-10-CM

## 2022-07-20 PROCEDURE — 88304 TISSUE EXAM BY PATHOLOGIST: CPT | Mod: TC | Performed by: DERMATOLOGY

## 2022-07-20 PROCEDURE — 88304 TISSUE EXAM BY PATHOLOGIST: CPT | Mod: 26 | Performed by: DERMATOLOGY

## 2022-07-20 PROCEDURE — 11102 TANGNTL BX SKIN SINGLE LES: CPT | Performed by: DERMATOLOGY

## 2022-07-20 PROCEDURE — 99214 OFFICE O/P EST MOD 30 MIN: CPT | Mod: 25 | Performed by: DERMATOLOGY

## 2022-07-20 RX ORDER — PRENATAL VIT 91/IRON/FOLIC/DHA 28-975-200
COMBINATION PACKAGE (EA) ORAL 2 TIMES DAILY
Qty: 42 G | Refills: 11 | Status: SHIPPED | OUTPATIENT
Start: 2022-07-20

## 2022-07-20 RX ORDER — CICLOPIROX 80 MG/ML
SOLUTION TOPICAL
Qty: 6 ML | Refills: 11 | Status: SHIPPED | OUTPATIENT
Start: 2022-07-20

## 2022-07-20 ASSESSMENT — PAIN SCALES - GENERAL: PAINLEVEL: NO PAIN (0)

## 2022-07-20 NOTE — LETTER
7/20/2022       RE: Sulaiman Marroquin  [address]  [facility] [address]     Dear Colleague,    Thank you for referring your patient, Sulaiman Marroquin, to the [facility] DERMATOLOGY [facility]INIC [place] at [facility]. Please see a copy of my visit note below.    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 Benign bx:  - Compound dysplastic nevus with mild atypia, R posterior calf, s/p shave bx 7/7/21  3. NUB, left posterior shoulder, sp shave 7/20/2022  4. Tinea pedis  - Terbinafine cream  5. Onychomycosis  - Ciclopirox, consider PO terbinafine  ____________________________________________    Assessment & Plan:    # Ocular nevus.  Per patient, has been present and stable since adolescence, has been evaluated by optometry. Advised continued monitoring and to notify ophtho for changes.     # Benign lesions: SKs, cherry angiomas. No treatment required.    # Multiple benign nevi.   # Solar lentigenes.  - No concerning lesions today  - Monitor for ABCDEs of melanoma   - Continue sun protection - recommend SPF 30 or higher with frequent application   - Return sooner if noticing changing or symptomatic lesions     # Tinea pedis - chronic, active  Discussed treatment options including terbinafine cream BID until it's clear and then 1-2 x weekly for prevention.   - Start terbinafine 1% cream BID.    # Onychomycosis - chronic, active  Discussed treatment options including PO terbinafine daily for 3 months including risks/benefits. Another option includes Ciclopirox which has much lower chance of improvement but low risk. Advised nails grow slowly over 12-18 months.   - Start applying ciclopirox 8% solution daily, remove once weekly. If irritation, take several days days off.     # NUB,   left posterior shoulder  ddx neurofibroma vs symptomatic nevus vs skin tag  - Shave biopsy performed today. See procedure note below.     Procedures Performed:   - Shave biopsy procedure note, location(s): left posterior shoulder. After discussion of benefits and risks including but not limited to bleeding, infection, scar, incomplete removal, recurrence, and non-diagnostic biopsy, written consent and photographs were obtained. The area was cleaned with isopropyl alcohol. 0.5mL of 1% lidocaine with epinephrine was injected to obtain adequate anesthesia of lesion(s). Shave biopsy at site(s) performed. Hemostasis was achieved with aluminium chloride. Petrolatum ointment and a sterile dressing were applied. The patient tolerated the procedure and no complications were noted. The patient was provided with verbal and written post care instructions.     Follow-up: 1 year(s) in-person, or earlier for new or changing lesions    Staff and Scribe:     Scribe Disclosure:  I, Brenda Louis, am serving as a scribe to document services personally performed by Lisa Gutierrez MD based on data collection and the provider's statements to me.     Provider Disclosure:   The documentation recorded by the scribe accurately reflects the services I personally performed and the decisions made by me.    Lisa Gutierrez MD    Department of Dermatology  [facility]: Phone: [phone], Fax: [phone]  7/20/2022     ____________________________________________    CC: Skin Check (Tank is here today for his annual skin check, He has no new areas of concern.)    HPI:  Mr. Sulaiman Marroquin is a(n) 72 year old male who presents today as a return patient for a full body skin exam. The patient was last seen in dermatology on 7/7/21 by myself at which time the patient had a shave biopsy performed on the right posterior calf which resulted as a  compound dysplastic nevus with mild atypia.    Today, the patient reports a growth on his left arm that has bothered him for some time. He notes that he recently returned from a [employer] cruise.     Patient is otherwise feeling well, without additional skin concerns.    Labs Reviewed:  N/A    Physical Exam:  Vitals: There were no vitals taken for this visit.  SKIN: Total skin excluding the undergarment areas was performed. The exam included the head/face, neck, both arms, chest, back, abdomen, both legs, digits and/or nails.   - Nails thickened and dystrophic on bilateral toenails.  - There are pink scaly plaques to bilateral feet in a moccasin like distribution and interdigital scale.  - Left posterior shoulder, flesh colored papule  - Red smooth well-defined macules on trunk and extremities.  - Brown, stuck-on scaly appearing papules on trunk and extremities.  - Well circumscribed macules with symmetric color distribution on trunk and extremities.  - Oconnor WD smooth macules on face, neck, trunk, and extremities.  - No other lesions of concern on areas examined.     Medications:  Current Outpatient Medications   Medication     albuterol (PROAIR HFA/PROVENTIL HFA/VENTOLIN HFA) 108 (90 Base) MCG/ACT inhaler     Ascorbic Acid (VITAMIN C PO)     atorvastatin (LIPITOR) 40 MG tablet     BREO ELLIPTA 100-25 MCG/INH inhaler     Calcium-Magnesium-Vitamin D (CALCIUM 500 PO)     Fexofenadine HCl (WAL-FEX PO)     Lactobacillus (PROBIOTIC ACIDOPHILUS PO)     lisinopril (ZESTRIL) 5 MG tablet     MELATONIN PO     Misc Natural Products (URINOZINC PO)     Multiple Vitamins-Minerals (CENTRUM SILVER 50+MEN PO)     vitamin E 400 units TABS     No current facility-administered medications for this visit.      Past Medical History:   Patient Active Problem List   Diagnosis     Hyperlipidemia LDL goal <130     Benign essential hypertension     Aneurysm of right renal artery (H)     Mild persistent asthma without complication     Leg cramps      Past Medical History:   Diagnosis Date     Aneurysm of right renal artery (H) 2015     Benign essential hypertension 1/14/2020     Chronic back pain 2007    currently seeing chiropractor at [facility] with Dr. Connell.      Uncomplicated asthma         CC Vaishali Car MD  [address] [place] [address] on close of this encounter.

## 2022-07-20 NOTE — PROGRESS NOTES
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 Benign bx:  - Compound dysplastic nevus with mild atypia, R posterior calf, s/p shave bx 7/7/21  3. NUB, left posterior shoulder, sp shave 7/20/2022  4. Tinea pedis  - Terbinafine cream  5. Onychomycosis  - Ciclopirox, consider PO terbinafine  ____________________________________________    Assessment & Plan:    # Ocular nevus.  Per patient, has been present and stable since adolescence, has been evaluated by optometry. Advised continued monitoring and to notify ophtho for changes.     # Benign lesions: SKs, cherry angiomas. No treatment required.    # Multiple benign nevi.   # Solar lentigenes.  - No concerning lesions today  - Monitor for ABCDEs of melanoma   - Continue sun protection - recommend SPF 30 or higher with frequent application   - Return sooner if noticing changing or symptomatic lesions     # Tinea pedis - chronic, active  Discussed treatment options including terbinafine cream BID until it's clear and then 1-2 x weekly for prevention.   - Start terbinafine 1% cream BID.    # Onychomycosis - chronic, active  Discussed treatment options including PO terbinafine daily for 3 months including risks/benefits. Another option includes Ciclopirox which has much lower chance of improvement but low risk. Advised nails grow slowly over 12-18 months.   - Start applying ciclopirox 8% solution daily, remove once weekly. If irritation, take several days days off.     # NUB,  left posterior shoulder  ddx neurofibroma vs symptomatic nevus vs skin tag  - Shave biopsy performed today. See procedure note below.     Procedures Performed:   - Shave biopsy procedure note, location(s): left posterior shoulder. After discussion of benefits and risks including but not limited to bleeding, infection, scar,  incomplete removal, recurrence, and non-diagnostic biopsy, written consent and photographs were obtained. The area was cleaned with isopropyl alcohol. 0.5mL of 1% lidocaine with epinephrine was injected to obtain adequate anesthesia of lesion(s). Shave biopsy at site(s) performed. Hemostasis was achieved with aluminium chloride. Petrolatum ointment and a sterile dressing were applied. The patient tolerated the procedure and no complications were noted. The patient was provided with verbal and written post care instructions.     Follow-up: 1 year(s) in-person, or earlier for new or changing lesions    Staff and Scribe:     Scribe Disclosure:  I, Brenda Louis, am serving as a scribe to document services personally performed by Lisa Gutierrez MD based on data collection and the provider's statements to me.     Provider Disclosure:   The documentation recorded by the scribe accurately reflects the services I personally performed and the decisions made by me.    Lisa Gutierrez MD    Department of Dermatology  [facility]: Phone: [phone], Fax: [phone]  7/20/2022     ____________________________________________    CC: Skin Check (Tank is here today for his annual skin check, He has no new areas of concern.)    HPI:  Mr. Sulaiman Marroquin is a(n) 72 year old male who presents today as a return patient for a full body skin exam. The patient was last seen in dermatology on 7/7/21 by myself at which time the patient had a shave biopsy performed on the right posterior calf which resulted as a compound dysplastic nevus with mild atypia.    Today, the patient reports a growth on his left arm that has bothered him for some time. He notes that he recently returned from a [employer] cruise.     Patient is otherwise feeling well, without additional skin concerns.    Labs Reviewed:  N/A    Physical Exam:  Vitals: There were  no vitals taken for this visit.  SKIN: Total skin excluding the undergarment areas was performed. The exam included the head/face, neck, both arms, chest, back, abdomen, both legs, digits and/or nails.   - Nails thickened and dystrophic on bilateral toenails.  - There are pink scaly plaques to bilateral feet in a moccasin like distribution and interdigital scale.  - Left posterior shoulder, flesh colored papule  - Red smooth well-defined macules on trunk and extremities.  - Brown, stuck-on scaly appearing papules on trunk and extremities.  - Well circumscribed macules with symmetric color distribution on trunk and extremities.  - Oconnor WD smooth macules on face, neck, trunk, and extremities.  - No other lesions of concern on areas examined.     Medications:  Current Outpatient Medications   Medication     albuterol (PROAIR HFA/PROVENTIL HFA/VENTOLIN HFA) 108 (90 Base) MCG/ACT inhaler     Ascorbic Acid (VITAMIN C PO)     atorvastatin (LIPITOR) 40 MG tablet     BREO ELLIPTA 100-25 MCG/INH inhaler     Calcium-Magnesium-Vitamin D (CALCIUM 500 PO)     Fexofenadine HCl (WAL-FEX PO)     Lactobacillus (PROBIOTIC ACIDOPHILUS PO)     lisinopril (ZESTRIL) 5 MG tablet     MELATONIN PO     Misc Natural Products (URINOZINC PO)     Multiple Vitamins-Minerals (CENTRUM SILVER 50+MEN PO)     vitamin E 400 units TABS     No current facility-administered medications for this visit.      Past Medical History:   Patient Active Problem List   Diagnosis     Hyperlipidemia LDL goal <130     Benign essential hypertension     Aneurysm of right renal artery (H)     Mild persistent asthma without complication     Leg cramps     Past Medical History:   Diagnosis Date     Aneurysm of right renal artery (H) 2015     Benign essential hypertension 1/14/2020     Chronic back pain 2007    currently seeing chiropractor at [facility] with Dr. Connell.      Uncomplicated asthma         CC Vaishali Car MD  [address] [place] [address] on close of this encounter.

## 2022-07-20 NOTE — TELEPHONE ENCOUNTER
Attempted to reach patient to schedule follow up in the Dermatology Clinic.  No answer,  LM on VM to call office and Slingbox message sent.    Schedule with Dr. Lisa Gutierrez -7/2023.

## 2022-07-20 NOTE — NURSING NOTE
Lidocaine-epinephrine 1-1:677888 % injection   0.5mL once for one use, starting 7/20/2022 ending 7/20/2022,  2mL disp, R-0, injection  Injected by Dayanna Gutierrez, CMA

## 2022-07-20 NOTE — PATIENT INSTRUCTIONS
Wound Care After a Biopsy    What is a skin biopsy?  A skin biopsy allows the doctor to examine a very small piece of tissue under the microscope to determine the diagnosis and the best treatment for the skin condition. A local anesthetic (numbing medicine)  is injected with a very small needle into the skin area to be tested. A small piece of skin is taken from the area. Sometimes a suture (stitch) is used.     What are the risks of a skin biopsy?  I will experience scar, bleeding, swelling, pain, crusting and redness. I may experience incomplete removal or recurrence. Risks of this procedure are excessive bleeding, bruising, infection, nerve damage, numbness, thick (hypertrophic or keloidal) scar and non-diagnostic biopsy.    How should I care for my wound for the first 24 hours?  Keep the wound dry and covered for 24 hours  If it bleeds, hold direct pressure on the area for 15 minutes. If bleeding does not stop then go to the emergency room  Avoid strenuous exercise the first 1-2 days or as your doctor instructs you    How should I care for the wound after 24 hours?  After 24 hours, remove the bandage  You may bathe or shower as normal  If you had a scalp biopsy, you can shampoo as usual and can use shower water to clean the biopsy site daily  Clean the wound twice a day with gentle soap and water  Do not scrub, be gentle  Apply white petroleum/Vaseline after cleaning the wound with a cotton swab or a clean finger, and keep the site covered with a Bandaid /bandage. Bandages are not necessary with a scalp biopsy  If you are unable to cover the site with a Bandaid /bandage, re-apply ointment 2-3 times a day to keep the site moist. Moisture will help with healing  Avoid strenuous activity for first 1-2 days  Avoid lakes, rivers, pools, and oceans until the stitches are removed or the site is healed    How do I clean my wound?  Wash hands thoroughly with soap or use hand  before all wound care  Clean the  wound with gentle soap and water  Apply white petroleum/Vaseline  to wound after it is clean  Replace the Bandaid /bandage to keep the wound covered for the first few days or as instructed by your doctor  If you had a scalp biopsy, warm shower water to the area on a daily basis should suffice    What should I use to clean my wound?   Cotton-tipped applicators (Qtips )  White petroleum jelly (Vaseline ). Use a clean new container and use Q-tips to apply.  Bandaids   as needed  Gentle soap     How should I care for my wound long term?  Do not get your wound dirty  Keep up with wound care for one week or until the area is healed.  A small scab will form and fall off by itself when the area is completely healed. The area will be red and will become pink in color as it heals. Sun protection is very important for how your scar will turn out. Sunscreen with an SPF 30 or greater is recommended once the area is healed.  If you have stitches, stitches need to be removed in 14 days. You may return to our clinic for this or you may have it done locally at your doctor s office.  You should have some soreness but it should be mild and slowly go away over several days. Talk to your doctor about using tylenol for pain,    When should I call my doctor?  If you have increased:   Pain or swelling  Pus or drainage (clear or slightly yellow drainage is ok)  Temperature over 100F  Spreading redness or warmth around wound    When will I hear about my results?  The biopsy results can take 2 weeks to come back.  Your results will automatically release to [employer] before your provider has even reviewed them.  The clinic will call you with the results, send you a Mimeo message, or have you schedule a follow-up clinic or phone time to discuss the results.  Contact our clinics if you do not hear from us in 2 weeks.    Who should I call with questions?  [facility]: [phone]  [facility]  [place]: [phone]  For urgent needs outside of business hours call the [facility] at [phone] and ask for the dermatology resident on call

## 2022-07-20 NOTE — NURSING NOTE
Chief Complaint   Patient presents with     Skin Check     Tank is here today for his annual skin check, He has no new areas of concern.     Scott Dorsey Paramedic

## 2022-07-21 LAB
PATH REPORT.COMMENTS IMP SPEC: NORMAL
PATH REPORT.COMMENTS IMP SPEC: NORMAL
PATH REPORT.FINAL DX SPEC: NORMAL
PATH REPORT.GROSS SPEC: NORMAL
PATH REPORT.MICROSCOPIC SPEC OTHER STN: NORMAL
PATH REPORT.RELEVANT HX SPEC: NORMAL

## 2022-07-26 ENCOUNTER — TELEPHONE (OUTPATIENT)
Dept: DERMATOLOGY | Facility: CLINIC | Age: 72
End: 2022-07-26

## 2022-07-26 NOTE — TELEPHONE ENCOUNTER
2nd attempted call to patient to schedule follow up in the Dermatology Clinic per Dr Gutierrez last visit on 07/20/22 checkout comment dispositions. Left message with clinic number.    Schedule follow up for 1 year around July 20 2023.

## 2022-08-17 ENCOUNTER — MYC MEDICAL ADVICE (OUTPATIENT)
Dept: FAMILY MEDICINE | Facility: CLINIC | Age: 72
End: 2022-08-17

## 2022-08-17 NOTE — TELEPHONE ENCOUNTER
Writer responded via [employer].    MANJIT DriscollN, RN  [facility]

## 2022-09-24 DIAGNOSIS — J45.30 MILD PERSISTENT ASTHMA WITHOUT COMPLICATION: ICD-10-CM

## 2022-09-27 ENCOUNTER — MYC MEDICAL ADVICE (OUTPATIENT)
Dept: FAMILY MEDICINE | Facility: CLINIC | Age: 72
End: 2022-09-27

## 2022-09-27 NOTE — TELEPHONE ENCOUNTER
[employer] message sent to patient for ACT completion and to schedule Medicare annual wellness visit.      Prescription approved per [facility] Refill Protocol.    Thank you!  MANJIT DriscollN, RN  [facility]

## 2022-11-18 ENCOUNTER — MYC MEDICAL ADVICE (OUTPATIENT)
Dept: FAMILY MEDICINE | Facility: CLINIC | Age: 72
End: 2022-11-18

## 2022-11-19 ENCOUNTER — HEALTH MAINTENANCE LETTER (OUTPATIENT)
Age: 72
End: 2022-11-19

## 2022-11-29 DIAGNOSIS — J45.30 MILD PERSISTENT ASTHMA WITHOUT COMPLICATION: ICD-10-CM

## 2022-11-29 RX ORDER — FLUTICASONE FUROATE AND VILANTEROL 100; 25 UG/1; UG/1
POWDER RESPIRATORY (INHALATION)
Qty: 60 EACH | Refills: 3 | Status: SHIPPED | OUTPATIENT
Start: 2022-11-29 | End: 2023-05-31

## 2022-11-29 ASSESSMENT — ASTHMA QUESTIONNAIRES
QUESTION_5 LAST FOUR WEEKS HOW WOULD YOU RATE YOUR ASTHMA CONTROL: WELL CONTROLLED
QUESTION_3 LAST FOUR WEEKS HOW OFTEN DID YOUR ASTHMA SYMPTOMS (WHEEZING, COUGHING, SHORTNESS OF BREATH, CHEST TIGHTNESS OR PAIN) WAKE YOU UP AT NIGHT OR EARLIER THAN USUAL IN THE MORNING: NOT AT ALL
QUESTION_2 LAST FOUR WEEKS HOW OFTEN HAVE YOU HAD SHORTNESS OF BREATH: NOT AT ALL
ACT_TOTALSCORE: 24
QUESTION_4 LAST FOUR WEEKS HOW OFTEN HAVE YOU USED YOUR RESCUE INHALER OR NEBULIZER MEDICATION (SUCH AS ALBUTEROL): NOT AT ALL
ACT_TOTALSCORE: 24
QUESTION_1 LAST FOUR WEEKS HOW MUCH OF THE TIME DID YOUR ASTHMA KEEP YOU FROM GETTING AS MUCH DONE AT WORK, SCHOOL OR AT HOME: NONE OF THE TIME

## 2022-11-29 NOTE — TELEPHONE ENCOUNTER
Asthma Follow-Up    Was ACT completed today?    no   ACT Total Scores 12/2/2021   ACT TOTAL SCORE (Goal Greater than or Equal to 20) 25   In the past 12 months, how many times did you visit the emergency room for your asthma without being admitted to the hospital? 0   In the past 12 months, how many times were you hospitalized overnight because of your asthma? 0         Health Maintenance Due   Topic Date Due     HEPATITIS B IMMUNIZATION (1 of 3 - Risk 3-dose series) Never done     ZOSTER IMMUNIZATION (2 of 2) 10/30/2020     CT CHEST W/O CONTRAST  01/28/2022     MEDICARE ANNUAL WELLNESS VISIT  04/23/2022     FALL RISK ASSESSMENT  04/23/2022     ASTHMA CONTROL TEST  06/02/2022     COVID-19 Vaccine (5 - Booster for Moderna series) 06/03/2022     CMP  12/02/2022     LIPID  12/02/2022     MICROALBUMIN  12/02/2022     ANNUAL REVIEW OF HM ORDERS  12/02/2022     ASTHMA ACTION PLAN  12/02/2022       Last OV was with me 12/2/2021, next barb with me 1/19/23. I will refill today.

## 2022-11-30 ENCOUNTER — TELEPHONE (OUTPATIENT)
Dept: FAMILY MEDICINE | Facility: CLINIC | Age: 72
End: 2022-11-30

## 2022-12-05 ENCOUNTER — VIRTUAL VISIT (OUTPATIENT)
Dept: FAMILY MEDICINE | Facility: CLINIC | Age: 72
End: 2022-12-05
Payer: COMMERCIAL

## 2022-12-05 DIAGNOSIS — Z29.89 ALTITUDE SICKNESS PREVENTATIVE MEASURES: Primary | ICD-10-CM

## 2022-12-05 PROCEDURE — 99213 OFFICE O/P EST LOW 20 MIN: CPT | Mod: 95 | Performed by: NURSE PRACTITIONER

## 2022-12-05 RX ORDER — ACETAZOLAMIDE 125 MG/1
125 TABLET ORAL 2 TIMES DAILY
Qty: 14 TABLET | Refills: 0 | Status: SHIPPED | OUTPATIENT
Start: 2022-12-05 | End: 2023-01-19

## 2022-12-05 NOTE — PROGRESS NOTES
Tank is a 72 year old who is being evaluated via a billable video visit.      How would you like to obtain your AVS? MyChart  If the video visit is dropped, the invitation should be resent by: Send to e-mail at: [email or web address]  Will anyone else be joining your video visit? No          Assessment & Plan     Altitude sickness preventative measures  Overall, he has done well with altitude in the past, but agreeable to acetazolamide recommended starting the day before he leaves as he will immediately be at high altitude, can continue 2-4 days and discontinue once acclimated.  Unfortunately, we do not have time to do his hepatitis A/B series prior to his departure, but other recommended vaccines are up to date other than final shingrix dose  - acetaZOLAMIDE (DIAMOX) 125 MG tablet; Take 1 tablet (125 mg) by mouth 2 times daily    Prescription drug management  I spent a total of 25 minutes on the day of the visit.   Time spent doing chart review, history and exam, documentation and further activities per the note           No follow-ups on file.   Follow-up Visit   Expected date:  Jan 05, 2023 (Approximate)      Follow Up Appointment Details:     Follow-up with whom?: Me    Follow-Up for what?: Medicare Wellness    Welcome or Annual?: Annual Wellness    How?: In Person or Video                    ALVARADO [facility]   Tank is a 72 year old, presenting for the following health issues:  Follow Up (PT is traveling out of the country and wants updates.)      History of Present Illness       Reason for visit:  Upcoming trip to [place]--any vaccinations needed? How to deal with potential altitude sickness?    He eats 2-3 servings of fruits and vegetables daily.He consumes 0 sweetened beverage(s) daily.He exercises with enough effort to increase his heart rate 9 or less minutes per day.  He exercises with enough effort to increase his heart rate 3 or less days  "per week.   He is taking medications regularly.       Will be going to [facility] [place] [facility] [place] [facility] [facility] with close family friend.  Leave this this Thursday- Dec 19th.    Wondering about vaccination requirements.    Will be at 8300 feet.  As a kid went to the [facility], and would camp at 8-9000 feet.      Review of Systems   Constitutional, HEENT, cardiovascular, pulmonary, gi and gu systems are negative, except as otherwise noted.      Objective    Vitals - Patient Reported  Weight (Patient Reported): 95.3 kg (210 lb)  Height (Patient Reported): 185.4 cm (6' 1\")  BMI (Based on Pt Reported Ht/Wt): 27.71  Pain Score: No Pain (0)        Physical Exam   GENERAL: Healthy, alert and no distress  EYES: Eyes grossly normal to inspection.  No discharge or erythema, or obvious scleral/conjunctival abnormalities.  RESP: No audible wheeze, cough, or visible cyanosis.  No visible retractions or increased work of breathing.    SKIN: Visible skin clear. No significant rash, abnormal pigmentation or lesions.  NEURO: Cranial nerves grossly intact.  Mentation and speech appropriate for age.  PSYCH: Mentation appears normal, affect normal/bright, judgement and insight intact, normal speech and appearance well-groomed.                Video-Visit Details    Video Start Time: 1544    Type of service:  Video Visit    Video End Time:1559    Originating Location (pt. Location): Home        Distant Location (provider location):  On-site    Platform used for Video Visit: [place]    "

## 2022-12-06 NOTE — TELEPHONE ENCOUNTER
ACT Total Scores 4/23/2021 12/2/2021 11/29/2022   ACT TOTAL SCORE (Goal Greater than or Equal to 20) 25 25 24   In the past 12 months, how many times did you visit the emergency room for your asthma without being admitted to the hospital? 0 0 0   In the past 12 months, how many times were you hospitalized overnight because of your asthma? 0 0 0     No further action needed from triage.    MANJIT DriscollN, RN-BC  [facility]     Face Mask

## 2023-01-16 ASSESSMENT — ASTHMA QUESTIONNAIRES
QUESTION_2 LAST FOUR WEEKS HOW OFTEN HAVE YOU HAD SHORTNESS OF BREATH: NOT AT ALL
QUESTION_4 LAST FOUR WEEKS HOW OFTEN HAVE YOU USED YOUR RESCUE INHALER OR NEBULIZER MEDICATION (SUCH AS ALBUTEROL): NOT AT ALL
QUESTION_3 LAST FOUR WEEKS HOW OFTEN DID YOUR ASTHMA SYMPTOMS (WHEEZING, COUGHING, SHORTNESS OF BREATH, CHEST TIGHTNESS OR PAIN) WAKE YOU UP AT NIGHT OR EARLIER THAN USUAL IN THE MORNING: NOT AT ALL
QUESTION_1 LAST FOUR WEEKS HOW MUCH OF THE TIME DID YOUR ASTHMA KEEP YOU FROM GETTING AS MUCH DONE AT WORK, SCHOOL OR AT HOME: NONE OF THE TIME
QUESTION_5 LAST FOUR WEEKS HOW WOULD YOU RATE YOUR ASTHMA CONTROL: COMPLETELY CONTROLLED
ACT_TOTALSCORE: 25
ACT_TOTALSCORE: 25

## 2023-01-16 ASSESSMENT — PATIENT HEALTH QUESTIONNAIRE - PHQ9: SUM OF ALL RESPONSES TO PHQ QUESTIONS 1-9: 4

## 2023-01-19 ENCOUNTER — OFFICE VISIT (OUTPATIENT)
Dept: FAMILY MEDICINE | Facility: CLINIC | Age: 73
End: 2023-01-19
Payer: COMMERCIAL

## 2023-01-19 VITALS
DIASTOLIC BLOOD PRESSURE: 76 MMHG | HEART RATE: 72 BPM | WEIGHT: 218.25 LBS | BODY MASS INDEX: 28.93 KG/M2 | SYSTOLIC BLOOD PRESSURE: 132 MMHG | RESPIRATION RATE: 12 BRPM | HEIGHT: 73 IN | TEMPERATURE: 97.5 F | OXYGEN SATURATION: 95 %

## 2023-01-19 DIAGNOSIS — I72.2 ANEURYSM OF RIGHT RENAL ARTERY (H): ICD-10-CM

## 2023-01-19 DIAGNOSIS — J45.30 MILD PERSISTENT ASTHMA WITHOUT COMPLICATION: ICD-10-CM

## 2023-01-19 DIAGNOSIS — I10 BENIGN ESSENTIAL HYPERTENSION: ICD-10-CM

## 2023-01-19 DIAGNOSIS — Z00.00 ENCOUNTER FOR MEDICARE ANNUAL WELLNESS EXAM: Primary | ICD-10-CM

## 2023-01-19 DIAGNOSIS — E78.5 HYPERLIPIDEMIA LDL GOAL <130: ICD-10-CM

## 2023-01-19 DIAGNOSIS — Z12.5 SCREENING FOR PROSTATE CANCER: ICD-10-CM

## 2023-01-19 LAB
ALBUMIN SERPL BCG-MCNC: 4.2 G/DL (ref 3.5–5.2)
ALP SERPL-CCNC: 57 U/L (ref 40–129)
ALT SERPL W P-5'-P-CCNC: 45 U/L (ref 10–50)
ANION GAP SERPL CALCULATED.3IONS-SCNC: 15 MMOL/L (ref 7–15)
AST SERPL W P-5'-P-CCNC: 37 U/L (ref 10–50)
BILIRUB SERPL-MCNC: 0.6 MG/DL
BUN SERPL-MCNC: 20 MG/DL (ref 8–23)
CALCIUM SERPL-MCNC: 10 MG/DL (ref 8.8–10.2)
CHLORIDE SERPL-SCNC: 105 MMOL/L (ref 98–107)
CHOLEST SERPL-MCNC: 143 MG/DL
CREAT SERPL-MCNC: 1.23 MG/DL (ref 0.67–1.17)
CREAT UR-MCNC: 150 MG/DL
DEPRECATED HCO3 PLAS-SCNC: 21 MMOL/L (ref 22–29)
GFR SERPL CREATININE-BSD FRML MDRD: 62 ML/MIN/1.73M2
GLUCOSE SERPL-MCNC: 106 MG/DL (ref 70–99)
HDLC SERPL-MCNC: 46 MG/DL
LDLC SERPL CALC-MCNC: 77 MG/DL
MICROALBUMIN UR-MCNC: <12 MG/L
MICROALBUMIN/CREAT UR: NORMAL MG/G{CREAT}
NONHDLC SERPL-MCNC: 97 MG/DL
POTASSIUM SERPL-SCNC: 4.2 MMOL/L (ref 3.4–5.3)
PROT SERPL-MCNC: 7.3 G/DL (ref 6.4–8.3)
PSA SERPL-MCNC: 0.39 NG/ML (ref 0–6.5)
SODIUM SERPL-SCNC: 141 MMOL/L (ref 136–145)
TRIGL SERPL-MCNC: 102 MG/DL

## 2023-01-19 PROCEDURE — 82570 ASSAY OF URINE CREATININE: CPT | Performed by: FAMILY MEDICINE

## 2023-01-19 PROCEDURE — G0103 PSA SCREENING: HCPCS | Performed by: FAMILY MEDICINE

## 2023-01-19 PROCEDURE — 99214 OFFICE O/P EST MOD 30 MIN: CPT | Mod: 25 | Performed by: FAMILY MEDICINE

## 2023-01-19 PROCEDURE — 80053 COMPREHEN METABOLIC PANEL: CPT | Performed by: FAMILY MEDICINE

## 2023-01-19 PROCEDURE — G0439 PPPS, SUBSEQ VISIT: HCPCS | Performed by: FAMILY MEDICINE

## 2023-01-19 PROCEDURE — 82043 UR ALBUMIN QUANTITATIVE: CPT | Performed by: FAMILY MEDICINE

## 2023-01-19 PROCEDURE — 36415 COLL VENOUS BLD VENIPUNCTURE: CPT | Performed by: FAMILY MEDICINE

## 2023-01-19 PROCEDURE — 80061 LIPID PANEL: CPT | Performed by: FAMILY MEDICINE

## 2023-01-19 RX ORDER — ATORVASTATIN CALCIUM 40 MG/1
40 TABLET, FILM COATED ORAL DAILY
Qty: 90 TABLET | Refills: 3 | Status: SHIPPED | OUTPATIENT
Start: 2023-01-19 | End: 2024-02-02

## 2023-01-19 ASSESSMENT — ENCOUNTER SYMPTOMS
DYSURIA: 0
EYE PAIN: 0
FEVER: 0
COUGH: 0
DIARRHEA: 0
MYALGIAS: 1
CHILLS: 0
DIZZINESS: 0
NERVOUS/ANXIOUS: 0
ARTHRALGIAS: 1
NAUSEA: 0
JOINT SWELLING: 0
SHORTNESS OF BREATH: 0
PARESTHESIAS: 0
HEADACHES: 0
FREQUENCY: 0
CONSTIPATION: 0
HEMATURIA: 0
ABDOMINAL PAIN: 0
SORE THROAT: 0
HEMATOCHEZIA: 0
PALPITATIONS: 0
WEAKNESS: 0
HEARTBURN: 0

## 2023-01-19 ASSESSMENT — PATIENT HEALTH QUESTIONNAIRE - PHQ9
SUM OF ALL RESPONSES TO PHQ QUESTIONS 1-9: 4
10. IF YOU CHECKED OFF ANY PROBLEMS, HOW DIFFICULT HAVE THESE PROBLEMS MADE IT FOR YOU TO DO YOUR WORK, TAKE CARE OF THINGS AT HOME, OR GET ALONG WITH OTHER PEOPLE: NOT DIFFICULT AT ALL
SUM OF ALL RESPONSES TO PHQ QUESTIONS 1-9: 4

## 2023-01-19 ASSESSMENT — ACTIVITIES OF DAILY LIVING (ADL): CURRENT_FUNCTION: NO ASSISTANCE NEEDED

## 2023-01-19 NOTE — LETTER
My Asthma Action Plan    Name: Sulaiman Marroquin   YOB: 1950  Date: 1/19/2023   My doctor: Vaishali Car MD   My clinic: [facility]        My Control Medicine: Fluticasone furoate + vilanterol (Breo Ellipta)  -  100/25mcg once daily  My Rescue Medicine: Albuterol (Proair/Ventolin/Proventil HFA) 2-4 puffs EVERY 4 HOURS as needed. Use a spacer if recommended by your provider.   My Asthma Severity:   Mild Persistent  Know your asthma triggers:   None            GREEN ZONE   Good Control    I feel good    No cough or wheeze    Can work, sleep and play without asthma symptoms       Take your asthma control medicine every day.     1. If exercise triggers your asthma, take your rescue medication    15 minutes before exercise or sports, and    During exercise if you have asthma symptoms  2. Spacer to use with inhaler: If you have a spacer, make sure to use it with your inhaler             YELLOW ZONE Getting Worse  I have ANY of these:    I do not feel good    Cough or wheeze    Chest feels tight    Wake up at night   1. Keep taking your Green Zone medications  2. Start taking your rescue medicine:    every 20 minutes for up to 1 hour. Then every 4 hours for 24-48 hours.  3. If you stay in the Yellow Zone for more than 12-24 hours, contact your doctor.  4. If you do not return to the Green Zone in 12-24 hours or you get worse, start taking your oral steroid medicine if prescribed by your provider.           RED ZONE Medical Alert - Get Help  I have ANY of these:    I feel awful    Medicine is not helping    Breathing getting harder    Trouble walking or talking    Nose opens wide to breathe       1. Take your rescue medicine NOW  2. If your provider has prescribed an oral steroid medicine, start taking it NOW  3. Call your doctor NOW  4. If you are still in the Red Zone after 20 minutes and you have not reached your doctor:    Take your rescue medicine again and    Call  911 or go to the emergency room right away    See your regular doctor within 2 weeks of an Emergency Room or Urgent Care visit for follow-up treatment.          Annual Reminders:  Meet with Asthma Educator,  Flu Shot in the Fall, consider Pneumonia Vaccination for patients with asthma (aged 19 and older).    Pharmacy:    [id number] - [place], [place] - [address] AT [facility] & [address] [place] [address] [facility]ORE #[id number] - [place], [place] - [address] AT [facility] OF [address]    Electronically signed by Vaishali Car MD   Date: 01/19/23                      Asthma Triggers  How To Control Things That Make Your Asthma Worse    Triggers are things that make your asthma worse.  Look at the list below to help you find your triggers and what you can do about them.  You can help prevent asthma flare-ups by staying away from your triggers.      Trigger                                                          What you can do   Cigarette Smoke  Tobacco smoke can make asthma worse. Do not allow smoking in your home, car or around you.  Be sure no one smokes at a child s day care or school.  If you smoke, ask your health care provider for ways to help you quit.  Ask family members to quit too.  Ask your health care provider for a referral to Quit Plan to help you quit smoking, or call 8-362-325-PLAN.     Colds, Flu, Bronchitis  These are common triggers of asthma. Wash your hands often.  Don t touch your eyes, nose or mouth.  Get a flu shot every year.     Dust Mites  These are tiny bugs that live in cloth or carpet. They are too small to see. Wash sheets and blankets in hot water every week.   Encase pillows and mattress in dust mite proof covers.  Avoid having carpet if you can. If you have carpet, vacuum weekly.   Use a dust mask and HEPA vacuum.   Pollen and Outdoor Mold  Some people are allergic to trees, grass, or weed pollen, or molds. Try to keep your windows closed.  Limit time out  doors when pollen count is high.   Ask you health care provider about taking medicine during allergy season.     Animal Dander  Some people are allergic to skin flakes, urine or saliva from pets with fur or feathers. Keep pets with fur or feathers out of your home.    If you can t keep the pet outdoors, then keep the pet out of your bedroom.  Keep the bedroom door closed.  Keep pets off cloth furniture and away from stuffed toys.     Mice, Rats, and Cockroaches   Some people are allergic to the waste from these pests.   Cover food and garbage.  Clean up spills and food crumbs.  Store grease in the refrigerator.   Keep food out of the bedroom.   Indoor Mold  This can be a trigger if your home has high moisture. Fix leaking faucets, pipes, or other sources of water.   Clean moldy surfaces.  Dehumidify basement if it is damp and smelly.   Smoke, Strong Odors, and Sprays  These can reduce air quality. Stay away from strong odors and sprays, such as perfume, powder, hair spray, paints, smoke incense, paint, cleaning products, candles and new carpet.   Exercise or Sports  Some people with asthma have this trigger. Be active!  Ask your doctor about taking medicine before sports or exercise to prevent symptoms.    Warm up for 5-10 minutes before and after sports or exercise.     Other Triggers of Asthma  Cold air:  Cover your nose and mouth with a scarf.  Sometimes laughing or crying can be a trigger.  Some medicines and food can trigger asthma.

## 2023-01-19 NOTE — PROGRESS NOTES
SUBJECTIVE:   Tank is a 72 year old who presents for Preventive Visit and chronic disease management.    Weight gain  About 18 lb weight gain over the last 2 years. He eats regular meals, feels they're generally healthy, eats dinner at 5:30, breakfast at 7:30, and walks pretty regularly although a lot less in the winter.  Wt Readings from Last 4 Encounters:   01/19/23 99 kg (218 lb 4 oz)   12/02/21 97.1 kg (214 lb)   04/23/21 96.2 kg (212 lb)   07/21/20 91.3 kg (201 lb 3.2 oz)       Hyperlipidemia Follow-Up      Are you regularly taking any medication or supplement to lower your cholesterol?   Yes- atorvastatin 40 mg daily    Are you having muscle aches or other side effects that you think could be caused by your cholesterol lowering medication?  No    Hypertension Follow-up      Do you check your blood pressure regularly outside of the clinic? No     Are you following a low salt diet? Yes    Are your blood pressures ever more than 140 on the top number (systolic) OR more   than 90 on the bottom number (diastolic), for example 140/90? No    Asthma Follow-Up    Was ACT completed today?    Yes    ACT Total Scores 1/16/2023   ACT TOTAL SCORE (Goal Greater than or Equal to 20) 25   In the past 12 months, how many times did you visit the emergency room for your asthma without being admitted to the hospital? 0   In the past 12 months, how many times were you hospitalized overnight because of your asthma? 0          How many days per week do you miss taking your asthma controller medication?  0    Please describe any recent triggers for your asthma: None    Have you had any Emergency Room Visits, Urgent Care Visits, or Hospital Admissions since your last office visit?  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 Aneurysm is complex  in that there  is a single inflow artery and two outflow arteries. Recommend  continued surveillance.    GFR Estimate   Date Value Ref Range Status   12/02/2021 55 (L) >60 mL/min/1.73m2 Final     Comment:     As of July 11, 2021, eGFR is calculated by the CKD-EPI creatinine equation, without race adjustment. eGFR can be influenced by muscle mass, exercise, and diet. The reported eGFR is an estimation only and is only applicable if the renal function is stable.   01/14/2020 72 >60 mL/min/[1.73_m2] Final     Comment:     Non  GFR Calc  Starting 12/18/2018, serum creatinine based estimated GFR (eGFR) will be   calculated using the Chronic Kidney Disease Epidemiology Collaboration   (CKD-EPI) equation.     01/31/2019 72 >60 mL/min/[1.73_m2] Final     Comment:     Non  GFR Calc  Starting 12/18/2018, serum creatinine based estimated GFR (eGFR) will be   calculated using the Chronic Kidney Disease Epidemiology Collaboration   (CKD-EPI) equation.     10/26/2015 66 >60 mL/min/1.7m2 Final     Comment:     Non  GFR Calc     GFR Estimate If Black   Date Value Ref Range Status   01/14/2020 84 >60 mL/min/[1.73_m2] Final     Comment:      GFR Calc  Starting 12/18/2018, serum creatinine based estimated GFR (eGFR) will be   calculated using the Chronic Kidney Disease Epidemiology Collaboration   (CKD-EPI) equation.     01/31/2019 84 >60 mL/min/[1.73_m2] Final     Comment:      GFR Calc  Starting 12/18/2018, serum creatinine based estimated GFR (eGFR) will be   calculated using the Chronic Kidney Disease Epidemiology Collaboration   (CKD-EPI) equation.     10/26/2015 79 >60 mL/min/1.7m2 Final     Comment:      GFR Calc           Patient has been advised of split billing requirements and indicates understanding: Yes  Are you in the first 12 months of your Medicare coverage?  No    Healthy Habits:     In general, how would you rate your  "overall health?  Good    Frequency of exercise:  None    Do you usually eat at least 4 servings of fruit and vegetables a day, include whole grains    & fiber and avoid regularly eating high fat or \"junk\" foods?  Yes    Taking medications regularly:  Yes    Medication side effects:  None    Ability to successfully perform activities of daily living:  No assistance needed    Home Safety:  Throw rugs in the hallway and lack of grab bars in the bathroom    Hearing Impairment:  No hearing concerns    In the past 6 months, have you been bothered by leaking of urine?  No    In general, how would you rate your overall mental or emotional health?  Excellent      PHQ-2 Total Score: 3    Additional concerns today:  Yes      Have you ever done Advance Care Planning? (For example, a Health Directive, POLST, or a discussion with a medical provider or your loved ones about your wishes): No, advance care planning information given to patient to review.  Advanced care planning was discussed at today's visit.       Fall risk  Fallen 2 or more times in the past year?: No  Any fall with injury in the past year?: Yes    Cognitive Screening   1) Repeat 3 items (Leader, Season, Table)    2) Clock draw: NORMAL  3) 3 item recall: Recalls 3 objects  Results: 3 items recalled: COGNITIVE IMPAIRMENT LESS LIKELY    Mini-CogTM Copyright S Nora. Licensed by the author for use in [facility]; reprinted with permission ([email or web address]@.[email or web address]). All rights reserved.      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 No    Alcohol Use 1/19/2023   Prescreen: >3 drinks/day or >7 drinks/week? Not Applicable   Prescreen: >3 drinks/day or >7 drinks/week? -       Current providers sharing in care for this patient include:   Patient Care Team:  Vaishali Car MD as PCP - General (Family Medicine)  Lisa Gutierrez MD as Assigned Surgical Provider  Vaishali Car MD as Assigned PCP    The following health maintenance items are reviewed in Epic and correct as of today:  Health Maintenance   Topic Date Due     HEPATITIS B IMMUNIZATION (1 of 3 - Risk 3-dose series) Never done     ZOSTER IMMUNIZATION (2 of 2) 10/30/2020     CT CHEST W/O CONTRAST  01/28/2022     CMP  12/02/2022     LIPID  12/02/2022     MICROALBUMIN  12/02/2022     ASTHMA CONTROL TEST  07/19/2023     MEDICARE ANNUAL WELLNESS VISIT  01/19/2024     ANNUAL REVIEW OF HM ORDERS  01/19/2024     ASTHMA ACTION PLAN  01/19/2024     FALL RISK ASSESSMENT  01/19/2024     COLORECTAL CANCER SCREENING  03/21/2025     ADVANCE CARE PLANNING  01/19/2028     DTAP/TDAP/TD IMMUNIZATION (2 - Td or Tdap) 11/02/2028     HEPATITIS C SCREENING  Completed     PHQ-2 (once per calendar year)  Completed     INFLUENZA VACCINE  Completed     Pneumococcal Vaccine: 65+ Years  Completed     AORTIC ANEURYSM SCREENING (SYSTEM ASSIGNED)  Completed     COVID-19 Vaccine  Completed     IPV IMMUNIZATION  Aged Out     MENINGITIS IMMUNIZATION  Aged Out     BP Readings from Last 3 Encounters:   01/19/23 132/76   12/02/21 132/76   04/23/21 116/66    Wt Readings from Last 3 Encounters:   01/19/23 99 kg (218 lb 4 oz)   12/02/21 97.1 kg (214 lb)   04/23/21 96.2 kg (212 lb)                  Patient Active Problem List   Diagnosis     Hyperlipidemia LDL goal <130     Benign essential hypertension     Aneurysm of right renal artery (H)     Mild persistent asthma without complication     Leg cramps     Past Surgical History:   Procedure Laterality Date      COLONOSCOPY  2012     HERNIA REPAIR  1960       Social History     Tobacco Use     Smoking status: Never     Smokeless tobacco: Never   Substance Use Topics     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      Family History   Problem Relation Age of Onset     Alzheimer Disease Mother      Coronary Artery Disease Mother      Gastrointestinal Disease Father      Arrhythmia Father      Anxiety Disorder Father      Diabetes Paternal Grandmother      Breast Cancer Maternal Grandmother      Skin Cancer Brother          Current Outpatient Medications   Medication Sig Dispense Refill     Ascorbic Acid (VITAMIN C PO) Take 600 mg by mouth daily       atorvastatin (LIPITOR) 40 MG tablet Take 1 tablet (40 mg) by mouth daily 90 tablet 3     Calcium-Magnesium-Vitamin D (CALCIUM 500 PO) Take 1,000 mg by mouth daily       ciclopirox (PENLAC) 8 % external solution Apply to adjacent skin and affected nails daily.  Remove with alcohol every 7 days, then repeat. 6 mL 11     fluticasone-vilanterol (BREO ELLIPTA) 100-25 MCG/ACT inhaler INHALE 1 PUFF INTO THE LUNGS DAILY 60 each 3     Lactobacillus (PROBIOTIC ACIDOPHILUS PO)        lisinopril (ZESTRIL) 5 MG tablet TAKE 1 TABLET(5 MG) BY MOUTH DAILY 90 tablet 3     MELATONIN PO Take 3 mg by mouth nightly as needed       Misc Natural Products (URINOZINC PO) Take by mouth daily       Multiple Vitamins-Minerals (CENTRUM SILVER 50+MEN PO) Take by mouth daily       terbinafine (LAMISIL) 1 % external cream Apply topically 2 times daily To feet as needed 42 g 11     vitamin E 400 units TABS Take 400 Units by mouth daily       albuterol (PROAIR HFA/PROVENTIL HFA/VENTOLIN HFA) 108 (90 Base) MCG/ACT inhaler 1-2 puffs q4-6 hours prn SOB (Patient not taking: Reported on 1/19/2023) 18 g 3     Allergies   Allergen Reactions     Dogs      wheezing     Asa [Aspirin] Rash     Dust Mites      Recent Labs   Lab Test 12/02/21  1004 01/14/20  0840 01/31/19  0948   LDL 66 48 101*   HDL 45 40 40   TRIG 78 114  "198*   ALT 39 47  --    CR 1.29* 1.04 1.05   GFRESTIMATED 55* 72 72   GFRESTBLACK  --  84 84   POTASSIUM 4.3 4.3 4.1        Review of Systems   Constitutional: Negative for chills and fever.   HENT: Negative for congestion, ear pain, hearing loss and sore throat.    Eyes: Negative for pain and visual disturbance.   Respiratory: Negative for cough and shortness of breath.    Cardiovascular: Negative for chest pain, palpitations and peripheral edema.   Gastrointestinal: Negative for abdominal pain, constipation, diarrhea, heartburn, hematochezia and nausea.   Genitourinary: Positive for impotence. Negative for dysuria, frequency, genital sores, hematuria, penile discharge and urgency.   Musculoskeletal: Positive for arthralgias and myalgias. Negative for joint swelling.   Skin: Negative for rash.   Neurological: Negative for dizziness, weakness, headaches and paresthesias.   Psychiatric/Behavioral: Negative for mood changes. The patient is not nervous/anxious.      OBJECTIVE:   /76   Pulse 72   Temp 97.5  F (36.4  C) (Temporal)   Resp 12   Ht 1.842 m (6' 0.52\")   Wt 99 kg (218 lb 4 oz)   SpO2 95%   BMI 29.18 kg/m   Estimated body mass index is 29.18 kg/m  as calculated from the following:    Height as of this encounter: 1.842 m (6' 0.52\").    Weight as of this encounter: 99 kg (218 lb 4 oz).  Physical Exam  GENERAL: healthy, alert and no distress  EYES: Eyes grossly normal to inspection, PERRL and conjunctivae and sclerae normal  HENT: ear canals and TM's normal, nose and mouth without ulcers or lesions  NECK: no adenopathy, no asymmetry, masses, or scars and thyroid normal to palpation  RESP: lungs clear to auscultation - no rales, rhonchi or wheezes  CV: regular rate and rhythm, normal S1 S2, no S3 or S4, no murmur, click or rub, no peripheral edema and peripheral pulses strong  ABDOMEN: soft, nontender, no hepatosplenomegaly, no masses and bowel sounds normal  MS: no gross musculoskeletal defects " "noted, no edema  SKIN: no suspicious lesions or rashes  NEURO: Normal strength and tone, mentation intact and speech normal  PSYCH: mentation appears normal, affect normal/bright    ASSESSMENT / PLAN:     (Z00.00) Encounter for Medicare annual wellness exam  Routine    (J45.30) Mild persistent asthma without complication  (primary encounter diagnosis)  Comment: At goal  The current medical regimen is effective;  continue present plan and medications.    Plan: Asthma Action Plan (AAP)          (E78.5) Hyperlipidemia LDL goal <130  Comment:   LDL Cholesterol Calculated   Date Value Ref Range Status   12/02/2021 66 <=100 mg/dL Final   01/14/2020 48 <100 mg/dL Final     Comment:     Desirable:       <100 mg/dl    At goal  The current medical regimen is effective;  continue present plan and medications.    Plan: Lipid panel reflex to direct LDL Fasting,         Comprehensive metabolic panel, atorvastatin         (LIPITOR) 40 MG tablet            (I10) Benign essential hypertension  Comment:   BP Readings from Last 3 Encounters:   01/19/23 132/76   12/02/21 132/76   04/23/21 116/66    At goal  The current medical regimen is effective;  continue present plan and medications.    Plan: Comprehensive metabolic panel, Albumin Random         Urine Quantitative with Creat Ratio          (I72.2) Aneurysm of right renal artery (H)  Comment: stable, repeat imaging next year      (Z12.5) Screening for prostate cancer  Comment:   Plan: PSA, screen          Patient has been advised of split billing requirements and indicates understanding: Yes      COUNSELING:  Reviewed preventive health counseling, as reflected in patient instructions      BMI:   Estimated body mass index is 29.18 kg/m  as calculated from the following:    Height as of this encounter: 1.842 m (6' 0.52\").    Weight as of this encounter: 99 kg (218 lb 4 oz).   Weight management plan: Discussed healthy diet and exercise guidelines see AVS for activity " recommendations      He reports that he has never smoked. He has never used smokeless tobacco.      Appropriate preventive services were discussed with this patient, including applicable screening as appropriate for cardiovascular disease, diabetes, osteopenia/osteoporosis, and glaucoma.  As appropriate for age/gender, discussed screening for colorectal cancer, prostate cancer, breast cancer, and cervical cancer. Checklist reviewing preventive services available has been given to the patient.    Reviewed patients plan of care and provided an AVS. The Intermediate Care Plan ( asthma action plan, low back pain action plan, and migraine action plan) for Sulaiman meets the Care Plan requirement. This Care Plan has been established and reviewed with the Patient.          Vaishali Car MD  [facility]    Identified Health Risks:  Answers for HPI/ROS submitted by the patient on 1/19/2023  If you checked off any problems, how difficult have these problems made it for you to do your work, take care of things at home, or get along with other people?: Not difficult at all  PHQ9 TOTAL SCORE: 4        He is at risk for lack of exercise and has been provided with information to increase physical activity for the benefit of his well-being.

## 2023-01-19 NOTE — PATIENT INSTRUCTIONS
I recommend trying to do some sort of weight training; a plank, sit ups, lifting weight.  Run up and down stairs 1 or 2 times 3 times per day to get some cardiovascular workup.  Patient Education   Personalized Prevention Plan  You are due for the preventive services outlined below.  Your care team is available to assist you in scheduling these services.  If you have already completed any of these items, please share that information with your care team to update in your medical record.  Health Maintenance Due   Topic Date Due     Hepatitis B Vaccine (1 of 3 - Risk 3-dose series) Never done     Zoster (Shingles) Vaccine (2 of 2) 10/30/2020     CT Lungs  01/28/2022     Comprehensive Metabolic Panel  12/02/2022     Cholesterol Lab  12/02/2022     Kidney Microalbumin Urine Test  12/02/2022     Preventive Health Recommendations  See your health care provider every year to    Review health changes.     Discuss preventive care.      Review your medicines if your doctor has prescribed any.    Talk with your health care provider about whether you should have a test to screen for prostate cancer (PSA).    Every 3 years, have a diabetes test (fasting glucose). If you are at risk for diabetes, you should have this test more often.    Every 5 years, have a cholesterol test. Have this test more often if you are at risk for high cholesterol or heart disease.     Every 10 years, have a colonoscopy. Or, have a yearly FIT test (stool test). These exams will check for colon cancer.    Talk to with your health care provider about screening for Abdominal Aortic Aneurysm if you have a family history of AAA or have a history of smoking.    Shots:     Get a flu shot each year.     Get a tetanus shot every 10 years.     Talk to your doctor about your pneumonia vaccines. There are now two you should receive - Pneumovax (PPSV 23) and Prevnar (PCV 13).    Talk to your pharmacist about a shingles vaccine.     Talk to your doctor about the  hepatitis B vaccine.    Nutrition:     Eat at least 5 servings of fruits and vegetables each day.     Eat whole-grain bread, whole-wheat pasta and brown rice instead of white grains and rice.     Get adequate Calcium and Vitamin D.     Lifestyle    Exercise for at least 150 minutes a week (30 minutes a day, 5 days a week). This will help you control your weight and prevent disease.     Limit alcohol to one drink per day.     No smoking.     Wear sunscreen to prevent skin cancer.     See your dentist every six months for an exam and cleaning.     See your eye doctor every 1 to 2 years to screen for conditions such as glaucoma, macular degeneration and cataracts.    Personalized Prevention Plan  You are due for the preventive services outlined below.  Your care team is available to assist you in scheduling these services.  If you have already completed any of these items, please share that information with your care team to update in your medical record.  Health Maintenance   Topic Date Due     HEPATITIS B IMMUNIZATION (1 of 3 - Risk 3-dose series) Never done     ZOSTER IMMUNIZATION (2 of 2) 10/30/2020     CT CHEST W/O CONTRAST  01/28/2022     CMP  12/02/2022     LIPID  12/02/2022     MICROALBUMIN  12/02/2022     ASTHMA CONTROL TEST  07/19/2023     MEDICARE ANNUAL WELLNESS VISIT  01/19/2024     ANNUAL REVIEW OF HM ORDERS  01/19/2024     ASTHMA ACTION PLAN  01/19/2024     FALL RISK ASSESSMENT  01/19/2024     COLORECTAL CANCER SCREENING  03/21/2025     ADVANCE CARE PLANNING  01/19/2028     DTAP/TDAP/TD IMMUNIZATION (2 - Td or Tdap) 11/02/2028     HEPATITIS C SCREENING  Completed     PHQ-2 (once per calendar year)  Completed     INFLUENZA VACCINE  Completed     Pneumococcal Vaccine: 65+ Years  Completed     AORTIC ANEURYSM SCREENING (SYSTEM ASSIGNED)  Completed     COVID-19 Vaccine  Completed     IPV IMMUNIZATION  Aged Out     MENINGITIS IMMUNIZATION  Aged Out       Exercise for a Healthier Heart  You may wonder how you  can improve the health of your heart. If you re thinking about exercise, you re on the right track. You don t need to become an athlete. But you do need a certain amount of brisk exercise to help strengthen your heart. If you have been diagnosed with a heart condition, your healthcare provider may advise exercise to help stabilize your condition. To help make exercise a habit, choose safe, fun activities.      Exercise with a friend. When activity is fun, you're more likely to stick with it.   Before you start  Check with your healthcare provider before starting an exercise program. This is especially important if you have not been active for a while. It's also important if you have a long-term (chronic) health problem such as heart disease, diabetes, or obesity. Or if you are at high risk for having these problems.   Why exercise?  Exercising regularly offers many healthy rewards. It can help you do all of the following:     Improve your blood cholesterol level to help prevent further heart trouble    Lower your blood pressure to help prevent a stroke or heart attack    Control diabetes, or reduce your risk of getting this disease    Improve your heart and lung function    Reach and stay at a healthy weight    Make your muscles stronger so you can stay active    Prevent falls and fractures by slowing the loss of bone mass (osteoporosis)    Manage stress better    Reduce your blood pressure    Improve your sense of self and your body image  Exercise tips      Ease into your routine. Set small goals. Then build on them. If you are not sure what your activity level should be, talk with your healthcare provider first before starting an exercise routine.    Exercise on most days. Aim for a total of 150 minutes (2 hours and 30 minutes) or more of moderate-intensity aerobic activity each week. Or 75 minutes (1 hour and 15 minutes) or more of vigorous-intensity aerobic activity each week. Or try for a combination of both.  Moderate activity means that you breathe heavier and your heart rate increases but you can still talk. Think about doing 40 minutes of moderate exercise, 3 to 4 times a week. For best results, activity should last for about 40 minutes to lower blood pressure and cholesterol. It's OK to work up to the 40-minute period over time. Examples of moderate-intensity activity are walking 1 mile in 15 minutes. Or doing 30 to 45 minutes of yard work.    Step up your daily activity level.  Along with your exercise program, try being more active the whole day. Walk instead of drive. Or park further away so that you take more steps each day. Do more household tasks or yard work. You may not be able to meet the advised mount of physical activity. But doing some moderate- or vigorous-intensity aerobic activity can help reduce your risk for heart disease. Your healthcare provider can help you figure out what is best for you.    Choose 1 or more activities you enjoy.  Walking is one of the easiest things you can do. You can also try swimming, riding a bike, dancing, or taking an exercise class.    When to call your healthcare provider  Call your healthcare provider if you have any of these:     Chest pain or feel dizzy or lightheaded    Burning, tightness, pressure, or heaviness in your chest, neck, shoulders, back, or arms    Abnormal shortness of breath    More joint or muscle pain    A very fast or irregular heartbeat (palpitations)  Dania last reviewed this educational content on 7/1/2019 2000-2021 [employer]. All rights reserved. This information is not intended as a substitute for professional medical care. Always follow your healthcare professional's instructions.

## 2023-01-27 NOTE — RESULT ENCOUNTER NOTE
Thank you for getting labs done.     Your PSA (prostate cancer blood test)  is normal.   Your microalbumen is normal, which is great news. This means you do not have protein in the urine, and that your kidneys are currently functioning well. Keeping blood pressure and blood fats low is the best way to preserve your kidney function over your lifetime.   Good news, your LDL (or bad cholesterol) is at goal. Your medication is working well. Please continue to take your cholesterol lowering medication, atorvastatin, as you have been doing.    Your kidney function has improved (the GFR has improved from 55 to 62) which is good news, and your creatinine is slightly better as well. Kidney function is assessed by blood work that measures creatinine and calculates estimated GFR (glomerular filtration rate).  By current criteria you do have stage 3 chronic kidney disease as your eGFR has been < 60.  This is not something that is urgent as your value has not changed much with time.    However, it does mean will monitor your labs (urine and blood tests) every 6-12 months and we will keep trying to make sure your blood pressure and cholesterol are at goal to keep your kidneys as healthy as possible.    If you have any questions, please contact the clinic or schedule an appointment with me, thank you!    Sincerely,  Dr. Vaishali Car MD  1/27/2023

## 2023-05-12 DIAGNOSIS — I10 BENIGN ESSENTIAL HYPERTENSION: ICD-10-CM

## 2023-05-14 NOTE — TELEPHONE ENCOUNTER
"Routing refill request to provider for review/approval because:  Labs out of range:  cr    Last Written Prescription Date:  5/5/22  Last Fill Quantity: 90,  # refills: 3   Last office visit provider:  1/19/23     Requested Prescriptions   Pending Prescriptions Disp Refills     lisinopril (ZESTRIL) 5 MG tablet [Pharmacy Med Name: LISINOPRIL 5MG TABLETS] 90 tablet 3     Sig: TAKE 1 TABLET(5 MG) BY MOUTH DAILY       ACE Inhibitors (Including Combos) Protocol Failed - 5/12/2023 10:19 PM        Failed - Recent (12 mo) or future (30 days) visit within the authorizing provider's specialty     Patient has had an office visit with the authorizing provider or a provider within the authorizing providers department within the previous 12 mos or has a future within next 30 days. See \"Patient Info\" tab in inbasket, or \"Choose Columns\" in Meds & Orders section of the refill encounter.              Failed - Normal serum creatinine on file in past 12 months     Recent Labs   Lab Test 01/19/23  0921   CR 1.23*       Ok to refill medication if creatinine is low          Passed - Blood pressure under 140/90 in past 12 months     BP Readings from Last 3 Encounters:   01/19/23 132/76   12/02/21 132/76   04/23/21 116/66                 Passed - Medication is active on med list        Passed - Patient is age 18 or older        Passed - Normal serum potassium on file in past 12 months     Recent Labs   Lab Test 01/19/23  0921   POTASSIUM 4.2                  Chantal Max RN 05/14/23 10:32 AM  "

## 2023-05-16 RX ORDER — LISINOPRIL 5 MG/1
TABLET ORAL
Qty: 90 TABLET | Refills: 3 | Status: SHIPPED | OUTPATIENT
Start: 2023-05-16 | End: 2024-05-15

## 2023-05-29 DIAGNOSIS — J45.30 MILD PERSISTENT ASTHMA WITHOUT COMPLICATION: ICD-10-CM

## 2023-05-31 RX ORDER — FLUTICASONE FUROATE AND VILANTEROL TRIFENATATE 100; 25 UG/1; UG/1
POWDER RESPIRATORY (INHALATION)
Qty: 60 EACH | Refills: 0 | Status: SHIPPED | OUTPATIENT
Start: 2023-05-31 | End: 2023-07-09

## 2023-05-31 NOTE — TELEPHONE ENCOUNTER
---Prescription approved per [facility] Refill Protocol.       Cleo Marcelino RN BSN     [facility]          --Last visit:  1/19/2023     --Future Visit: none.          12/2/2021     9:12 AM 11/29/2022     3:38 PM 1/16/2023    11:10 AM   ACT Total Scores   ACT TOTAL SCORE (Goal Greater than or Equal to 20) 25 24 25   In the past 12 months, how many times did you visit the emergency room for your asthma without being admitted to the hospital? 0 0 0   In the past 12 months, how many times were you hospitalized overnight because of your asthma? 0 0 0

## 2023-07-08 DIAGNOSIS — J45.30 MILD PERSISTENT ASTHMA WITHOUT COMPLICATION: ICD-10-CM

## 2023-07-09 RX ORDER — FLUTICASONE FUROATE AND VILANTEROL TRIFENATATE 100; 25 UG/1; UG/1
POWDER RESPIRATORY (INHALATION)
Qty: 28 EACH | Refills: 0 | Status: SHIPPED | OUTPATIENT
Start: 2023-07-09 | End: 2023-07-25

## 2023-07-10 NOTE — TELEPHONE ENCOUNTER
"Last Written Prescription Date:  5/31/23  Last Fill Quantity: 60,  # refills: 0   Last office visit provider:  1/19/23     Requested Prescriptions   Pending Prescriptions Disp Refills     BREO ELLIPTA 100-25 MCG/ACT inhaler [Pharmacy Med Name: BREO ELLIPTA 100-25MCG ORAL INH(30)]       Sig: INHALE 1 PUFF INTO THE LUNGS DAILY       Inhaled Steroids Protocol Passed - 7/8/2023 11:57 AM        Passed - Patient is age 12 or older        Passed - Asthma control assessment score within normal limits in last 6 months     Please review ACT score.           Passed - Medication is active on med list        Passed - Recent (6 mo) or future (30 days) visit within the authorizing provider's specialty     Patient had office visit in the last 6 months or has a visit in the next 30 days with authorizing provider or within the authorizing provider's specialty.  See \"Patient Info\" tab in inbasket, or \"Choose Columns\" in Meds & Orders section of the refill encounter.                 Chantal Max RN 07/09/23 7:24 PM  "

## 2023-07-25 ENCOUNTER — MYC REFILL (OUTPATIENT)
Dept: FAMILY MEDICINE | Facility: CLINIC | Age: 73
End: 2023-07-25
Payer: COMMERCIAL

## 2023-07-25 DIAGNOSIS — J45.30 MILD PERSISTENT ASTHMA WITHOUT COMPLICATION: ICD-10-CM

## 2023-07-28 RX ORDER — FLUTICASONE FUROATE AND VILANTEROL 100; 25 UG/1; UG/1
1 POWDER RESPIRATORY (INHALATION) DAILY
Qty: 28 EACH | Refills: 0 | Status: SHIPPED | OUTPATIENT
Start: 2023-07-28 | End: 2023-09-05

## 2023-08-07 ENCOUNTER — MYC MEDICAL ADVICE (OUTPATIENT)
Dept: FAMILY MEDICINE | Facility: CLINIC | Age: 73
End: 2023-08-07
Payer: COMMERCIAL

## 2023-08-07 DIAGNOSIS — K42.0 UMBILICAL HERNIA WITH OBSTRUCTION: Primary | ICD-10-CM

## 2023-08-07 DIAGNOSIS — K42.9 UMBILICAL HERNIA WITHOUT OBSTRUCTION AND WITHOUT GANGRENE: ICD-10-CM

## 2023-08-07 NOTE — TELEPHONE ENCOUNTER
ASSESSMENT / PLAN:  ((K42.9) Umbilical hernia without obstruction and without gangrene  Comment: I agree with assessment and plan below, thanks!  Vaishali Car MD  8/8/2023    Plan: Adult General Surg Referral        Referral signed today. 1:26 PM        I called patient     He says the bulge is right above the belly button, this is exacerbated with straining such as doing a sit up or having a bowel movement     He is able to poke it back into his belly.     This has been going on for 6-8 months, comes and goes if doing more activity (exercise, walking).  He has noticed it more lately, thinks it could be because he has lost some weight.     Informed of signs/symptoms to be seen sooner and advised increase fluid and fiber intake to prevent constipation causing straining.     Pended gen surg referral.     RICHARD Britt RN  [facility]

## 2023-08-10 NOTE — TELEPHONE ENCOUNTER
REFERRAL INFORMATION:  Referring Provider:  Dr. Vaishali Car   Referring Clinic:  [facility]  Reason for Visit/Diagnosis: Umbilical hernia       FUTURE VISIT INFORMATION:  Appointment Date: 08-21-23  Appointment Time: @ 3:30pm      NOTES RECORD STATUS  DETAILS   OFFICE NOTE from Referring Provider Internal 08-08-23 Dr. Vaishali Car    OFFICE NOTE from Other Specialists NO    HOSPITAL DISCHARGE SUMMARY/ ED VISITS  NO    OPERATIVE REPORT NO    ENDOSCOPY (EGD)  NO    PERTINENT LABS Internal CMP: 01-19-23, 12-02-21, 01-14-20  BMP: 01-31-19  CBC/diff: 01-31-19   PATHOLOGY REPORTS (RELATED) NO    IMAGING (CT, MRI, US, XR)  Internal CT abd/pelvis: 01-28-20

## 2023-08-18 ENCOUNTER — PATIENT OUTREACH (OUTPATIENT)
Dept: SURGERY | Facility: CLINIC | Age: 73
End: 2023-08-18
Payer: COMMERCIAL

## 2023-08-18 NOTE — PROGRESS NOTES
Patient Telephone Reminder Call    Date of call:  08/18/23  Phone numbers:  Cell number on file:    Telephone Information:   Mobile [phone]       Reached patient/confirmed appointment:  No - left message:   on voicemail  Appointment with:   Dr. Robinson Charles  Reason for visit:  Hernia  Remind patient that this visit is a consultation only, NO procedure:  Yes  Can this visit be changed to a video visit:  No

## 2023-08-21 ENCOUNTER — OFFICE VISIT (OUTPATIENT)
Dept: SURGERY | Facility: CLINIC | Age: 73
End: 2023-08-21
Attending: FAMILY MEDICINE
Payer: COMMERCIAL

## 2023-08-21 ENCOUNTER — PRE VISIT (OUTPATIENT)
Dept: SURGERY | Facility: CLINIC | Age: 73
End: 2023-08-21

## 2023-08-21 VITALS
WEIGHT: 210.5 LBS | BODY MASS INDEX: 27.9 KG/M2 | HEART RATE: 67 BPM | SYSTOLIC BLOOD PRESSURE: 133 MMHG | OXYGEN SATURATION: 97 % | DIASTOLIC BLOOD PRESSURE: 68 MMHG | HEIGHT: 73 IN

## 2023-08-21 DIAGNOSIS — K42.9 UMBILICAL HERNIA WITHOUT OBSTRUCTION AND WITHOUT GANGRENE: ICD-10-CM

## 2023-08-21 PROCEDURE — 99203 OFFICE O/P NEW LOW 30 MIN: CPT | Mod: GC | Performed by: SURGERY

## 2023-08-21 RX ORDER — VITAMIN B COMPLEX
TABLET ORAL
COMMUNITY

## 2023-08-21 RX ORDER — FEXOFENADINE HCL 180 MG/1
TABLET ORAL
COMMUNITY

## 2023-08-21 ASSESSMENT — PAIN SCALES - GENERAL: PAINLEVEL: NO PAIN (0)

## 2023-08-21 NOTE — PROGRESS NOTES
General Surgery Clinic Consult Note    S: Sulaiman Marroquin reports noticing an umbilical bulge about 3 years ago and reports it has gradually increased in size. He denies symptoms of pain, changes in bowel habits, or instances of incarceration. It has always been reducible, but noticed more when straining for BM. He has never had abdominal surgery. He recently began a weight loss program, Noom, and has lost 9# over the last 3 weeks.    Past Medical History:   Diagnosis Date    Aneurysm of right renal artery (H) 2015    Benign essential hypertension 1/14/2020    Chronic back pain 2007    currently seeing chiropractor at [facility] with Dr. Connell.     Uncomplicated asthma         Past Surgical History:   Procedure Laterality Date    COLONOSCOPY  2012    HERNIA REPAIR  1960    Right inguinal hernia repair - pediatric    Current Outpatient Medications   Medication    Ascorbic Acid (VITAMIN C PO)    atorvastatin (LIPITOR) 40 MG tablet    Calcium-Magnesium-Vitamin D (CALCIUM 500 PO)    ciclopirox (PENLAC) 8 % external solution    Coenzyme Q10 (COQ10) 100 MG CAPS    fexofenadine (ALLEGRA) 180 MG tablet    fluticasone-vilanterol (BREO ELLIPTA) 100-25 MCG/ACT inhaler    Lactobacillus (PROBIOTIC ACIDOPHILUS PO)    lisinopril (ZESTRIL) 5 MG tablet    MELATONIN PO    Misc Natural Products (URINOZINC PO)    Multiple Vitamins-Minerals (CENTRUM SILVER 50+MEN PO)    terbinafine (LAMISIL) 1 % external cream    vitamin E 400 units TABS    albuterol (PROAIR HFA/PROVENTIL HFA/VENTOLIN HFA) 108 (90 Base) MCG/ACT inhaler     No current facility-administered medications for this visit.        O:  There were no vitals taken for this visit.     Gen: Awake, alert, answers questions appropriately   CV: Well perfused  Pulm: NLB on RA  Ab: Soft, non-distended, non-tender. Reducible, NT umbilical hernia.  Ext: Moves all extremities spontaneously    Imaging -  1/28/2020 CTA AP showed broad based fat containing umbilical  hernia.    A/P: 73 year old male with a reducible asymptomatic broad based umbilical hernia seen on imaging in 2020 containing fat. No indications for repair at this time.  - Patient agrees with plan for watchful waiting and will reengage if symptoms develop  - Continue weight loss program    Seen w/ Dr. Araceli Riley MD  PGY-7, General Surgery

## 2023-08-21 NOTE — LETTER
8/21/2023       RE: Sulaiman Marroquin  [address]  [place] [facility] [address]     Dear Colleague,    Thank you for referring your patient, Sulaiman Marroquin, to the [facility] [place] [facility]. Please see a copy of my visit note below.    General Surgery Clinic Consult Note    S: Sulaiman Marroquin reports noticing an umbilical bulge about 3 years ago and reports it has gradually increased in size. He denies symptoms of pain, changes in bowel habits, or instances of incarceration. It has always been reducible, but noticed more when straining for BM. He has never had abdominal surgery. He recently began a weight loss program, Noom, and has lost 9# over the last 3 weeks.    Past Medical History:   Diagnosis Date    Aneurysm of right renal artery (H) 2015    Benign essential hypertension 1/14/2020    Chronic back pain 2007    currently seeing [facility]ropractor at [facility] with Dr. Connell.     Uncomplicated asthma         Past Surgical History:   Procedure Laterality Date    COLONOSCOPY  2012    HERNIA REPAIR  1960    Right inguinal hernia repair - pediatric    Current Outpatient Medications   Medication    Ascorbic Acid (VITAMIN C PO)    atorvastatin (LIPITOR) 40 MG tablet    Calcium-Magnesium-Vitamin D (CALCIUM 500 PO)    ciclopirox (PENLAC) 8 % external solution    Coenzyme Q10 (COQ10) 100 MG CAPS    fexofenadine (ALLEGRA) 180 MG tablet    fluticasone-vilanterol (BREO ELLIPTA) 100-25 MCG/ACT inhaler    Lactobacillus (PROBIOTIC ACIDOPHILUS PO)    lisinopril (ZESTRIL) 5 MG tablet    MELATONIN PO    Misc Natural Products (URINOZINC PO)    Multiple Vitamins-Minerals (CENTRUM SILVER 50+MEN PO)    terbinafine (LAMISIL) 1 % external cream    vitamin E 400 units TABS    albuterol (PROAIR HFA/PROVENTIL HFA/VENTOLIN HFA) 108 (90 Base) MCG/ACT inhaler     No current facility-administered medications for this visit.         O:  There were no vitals taken for this visit.     Gen: Awake, alert, answers questions appropriately   CV: Well perfused  Pulm: NLB on RA  Ab: Soft, non-distended, non-tender. Reducible, NT umbilical hernia.  Ext: Moves all extremities spontaneously    Imaging -  1/28/2020 CTA AP showed broad based fat containing umbilical hernia.    A/P: 73 year old male with a reducible asymptomatic broad based umbilical hernia seen on imaging in 2020 containing fat. No indications for repair at this time.  - Patient agrees with plan for watchful waiting and will reengage if symptoms develop  - Continue weight loss program    Seen w/ . Araceli Riley MD  PGY-7, General Surgery            Again, thank you for allowing me to participate in the care of your patient.      Sincerely,    Robinson Charles MD

## 2023-08-21 NOTE — NURSING NOTE
"Chief Complaint   Patient presents with    New Patient     Umbilical hernia       Vitals:    08/21/23 1502   BP: 133/68   BP Location: Left arm   Patient Position: Sitting   Cuff Size: Adult Regular   Pulse: 67   SpO2: 97%   Weight: 95.5 kg (210 lb 8 oz)   Height: 1.842 m (6' 0.5\")       Body mass index is 28.16 kg/m .                          Abdullahi Morales EMT    "

## 2023-08-21 NOTE — PATIENT INSTRUCTIONS
You met with Dr. Robinson Charles.      Today's visit instructions:    Return to the Surgery Clinic on an as needed basis if you would like to consider hernia repair or with new or worsening symptoms.  Continue with your weight loss journey.      If you have questions please contact Lia RN or Shakira RN during regular clinic hours, Monday through Friday 7:30 AM - 4:00 PM, or you can contact us via [employer] at anytime.       If you have urgent needs after-hours, weekends, or holidays please call the hospital at [phone] and ask to speak with our on-call General Surgery Team.    Appointment [phone]  Nurse Advice (Lia or Shakira): [phone]   Surgery Scheduler (Caridad): [phone]  Fax: [phone]

## 2023-09-01 DIAGNOSIS — J45.30 MILD PERSISTENT ASTHMA WITHOUT COMPLICATION: ICD-10-CM

## 2023-09-05 RX ORDER — FLUTICASONE FUROATE AND VILANTEROL 100; 25 UG/1; UG/1
1 POWDER RESPIRATORY (INHALATION) DAILY
Qty: 60 EACH | Refills: 11 | Status: SHIPPED | OUTPATIENT
Start: 2023-09-05

## 2024-02-01 SDOH — HEALTH STABILITY: PHYSICAL HEALTH: ON AVERAGE, HOW MANY MINUTES DO YOU ENGAGE IN EXERCISE AT THIS LEVEL?: 50 MIN

## 2024-02-01 SDOH — HEALTH STABILITY: PHYSICAL HEALTH: ON AVERAGE, HOW MANY DAYS PER WEEK DO YOU ENGAGE IN MODERATE TO STRENUOUS EXERCISE (LIKE A BRISK WALK)?: 5 DAYS

## 2024-02-01 ASSESSMENT — ASTHMA QUESTIONNAIRES
QUESTION_3 LAST FOUR WEEKS HOW OFTEN DID YOUR ASTHMA SYMPTOMS (WHEEZING, COUGHING, SHORTNESS OF BREATH, CHEST TIGHTNESS OR PAIN) WAKE YOU UP AT NIGHT OR EARLIER THAN USUAL IN THE MORNING: NOT AT ALL
QUESTION_5 LAST FOUR WEEKS HOW WOULD YOU RATE YOUR ASTHMA CONTROL: COMPLETELY CONTROLLED
QUESTION_1 LAST FOUR WEEKS HOW MUCH OF THE TIME DID YOUR ASTHMA KEEP YOU FROM GETTING AS MUCH DONE AT WORK, SCHOOL OR AT HOME: NONE OF THE TIME
ACT_TOTALSCORE: 25
QUESTION_4 LAST FOUR WEEKS HOW OFTEN HAVE YOU USED YOUR RESCUE INHALER OR NEBULIZER MEDICATION (SUCH AS ALBUTEROL): NOT AT ALL
QUESTION_2 LAST FOUR WEEKS HOW OFTEN HAVE YOU HAD SHORTNESS OF BREATH: NOT AT ALL
ACT_TOTALSCORE: 25

## 2024-02-01 ASSESSMENT — SOCIAL DETERMINANTS OF HEALTH (SDOH): HOW OFTEN DO YOU GET TOGETHER WITH FRIENDS OR RELATIVES?: THREE TIMES A WEEK

## 2024-02-02 ENCOUNTER — OFFICE VISIT (OUTPATIENT)
Dept: FAMILY MEDICINE | Facility: CLINIC | Age: 74
End: 2024-02-02
Attending: FAMILY MEDICINE
Payer: COMMERCIAL

## 2024-02-02 VITALS
HEIGHT: 72 IN | TEMPERATURE: 97.5 F | OXYGEN SATURATION: 98 % | WEIGHT: 192.3 LBS | SYSTOLIC BLOOD PRESSURE: 128 MMHG | HEART RATE: 55 BPM | DIASTOLIC BLOOD PRESSURE: 70 MMHG | BODY MASS INDEX: 26.05 KG/M2 | RESPIRATION RATE: 16 BRPM

## 2024-02-02 DIAGNOSIS — B35.1 ONYCHOMYCOSIS: ICD-10-CM

## 2024-02-02 DIAGNOSIS — E78.5 HYPERLIPIDEMIA LDL GOAL <130: ICD-10-CM

## 2024-02-02 DIAGNOSIS — I72.2 ANEURYSM OF RIGHT RENAL ARTERY (H): ICD-10-CM

## 2024-02-02 DIAGNOSIS — J45.30 MILD PERSISTENT ASTHMA WITHOUT COMPLICATION: ICD-10-CM

## 2024-02-02 DIAGNOSIS — Z00.00 ENCOUNTER FOR MEDICARE ANNUAL WELLNESS EXAM: Primary | ICD-10-CM

## 2024-02-02 DIAGNOSIS — I10 BENIGN ESSENTIAL HYPERTENSION: ICD-10-CM

## 2024-02-02 LAB
ALBUMIN SERPL BCG-MCNC: 4.2 G/DL (ref 3.5–5.2)
ALP SERPL-CCNC: 72 U/L (ref 40–150)
ALT SERPL W P-5'-P-CCNC: 30 U/L (ref 0–70)
ANION GAP SERPL CALCULATED.3IONS-SCNC: 7 MMOL/L (ref 7–15)
AST SERPL W P-5'-P-CCNC: 27 U/L (ref 0–45)
BILIRUB SERPL-MCNC: 0.6 MG/DL
BUN SERPL-MCNC: 18.7 MG/DL (ref 8–23)
CALCIUM SERPL-MCNC: 9.1 MG/DL (ref 8.8–10.2)
CHLORIDE SERPL-SCNC: 104 MMOL/L (ref 98–107)
CHOLEST SERPL-MCNC: 147 MG/DL
CREAT SERPL-MCNC: 1.06 MG/DL (ref 0.67–1.17)
CREAT UR-MCNC: 77.7 MG/DL
DEPRECATED HCO3 PLAS-SCNC: 29 MMOL/L (ref 22–29)
EGFRCR SERPLBLD CKD-EPI 2021: 74 ML/MIN/1.73M2
FASTING STATUS PATIENT QL REPORTED: NORMAL
GLUCOSE SERPL-MCNC: 95 MG/DL (ref 70–99)
HDLC SERPL-MCNC: 51 MG/DL
LDLC SERPL CALC-MCNC: 82 MG/DL
MICROALBUMIN UR-MCNC: <12 MG/L
MICROALBUMIN/CREAT UR: NORMAL MG/G{CREAT}
NONHDLC SERPL-MCNC: 96 MG/DL
POTASSIUM SERPL-SCNC: 4.5 MMOL/L (ref 3.4–5.3)
PROT SERPL-MCNC: 7.3 G/DL (ref 6.4–8.3)
SODIUM SERPL-SCNC: 140 MMOL/L (ref 135–145)
TRIGL SERPL-MCNC: 71 MG/DL

## 2024-02-02 PROCEDURE — 36415 COLL VENOUS BLD VENIPUNCTURE: CPT | Performed by: FAMILY MEDICINE

## 2024-02-02 PROCEDURE — G0439 PPPS, SUBSEQ VISIT: HCPCS | Performed by: FAMILY MEDICINE

## 2024-02-02 PROCEDURE — 80053 COMPREHEN METABOLIC PANEL: CPT | Performed by: FAMILY MEDICINE

## 2024-02-02 PROCEDURE — 82043 UR ALBUMIN QUANTITATIVE: CPT | Performed by: FAMILY MEDICINE

## 2024-02-02 PROCEDURE — 80061 LIPID PANEL: CPT | Performed by: FAMILY MEDICINE

## 2024-02-02 PROCEDURE — 99214 OFFICE O/P EST MOD 30 MIN: CPT | Mod: 25 | Performed by: FAMILY MEDICINE

## 2024-02-02 PROCEDURE — 82570 ASSAY OF URINE CREATININE: CPT | Performed by: FAMILY MEDICINE

## 2024-02-02 RX ORDER — ATORVASTATIN CALCIUM 40 MG/1
40 TABLET, FILM COATED ORAL DAILY
Qty: 90 TABLET | Refills: 3 | Status: SHIPPED | OUTPATIENT
Start: 2024-02-02

## 2024-02-02 NOTE — PATIENT INSTRUCTIONS
Please call [facility] Radiology Department to schedule an outpatient appointment for your CTA at [phone].  I recommend Twinrix vaccine (Hepatitis A and B combination); 3 dose series.    Toe nail treatment  I recommend soaking feet for 10 to 20 minutes every night in an acidic solution, so 1/2 water and 1/2 vinegar, apple cider or white; tea tree oil; lemon juice in addition to using Penlac. Treatment can take up to 6 months or more.    Eating Healthy Foods: Care Instructions  With every meal, you can make healthy food choices. Try to eat a variety of fruits, vegetables, whole grains, lean proteins, and low-fat dairy products. This can help you get the right balance of nutrients, including vitamins and minerals. Small changes add up over time. You can start by adding one healthy food to your meals each day.    Try to make half your plate fruits and vegetables, one-fourth whole grains, and one-fourth lean proteins. Try including dairy with your meals.   Eat more fruits and vegetables. Try to have them with most meals and snacks.   Foods for healthy eating    Fruits    These can be fresh, frozen, canned, or dried.  Try to choose whole fruit rather than fruit juice.  Eat a variety of colors.    Vegetables    These can be fresh, frozen, canned, or dried.  Beans, peas, and lentils count too.    Whole grains    Choose whole-grain breads, cereals, and noodles.  Try brown rice.    Lean proteins    These can include lean meat, poultry, fish, and eggs.  You can also have tofu, beans, peas, lentils, nuts, and seeds.    Dairy    Try milk, yogurt, and cheese.  Choose low-fat or fat-free when you can.  If you need to, use lactose-free milk or fortified plant-based milk products, such as soy milk.    Water    Drink water when you're thirsty.  Limit sugar-sweetened drinks, including soda, fruit drinks, and sports drinks.  Where can you learn more?  Go to https://www.healthwise.net/patiented  Enter T756 in the search box to  "learn more about \"Eating Healthy Foods: Care Instructions.\"  Current as of: February 28, 2023               Content Version: 13.8    4344-8007 [employer].   Care instructions adapted under license by your healthcare professional. If you have questions about a medical condition or this instruction, always ask your healthcare professional. [employer] disclaims any warranty or liability for your use of this information.      Nutrition for Older Adults: Care Instructions  Overview     Good nutrition is important at any age. But it is especially important for older adults. Eating a healthy diet helps keep your body strong. And it can help lower your risk for disease.  As you get older, your body needs more of certain nutrients. These include vitamin B12, calcium, and vitamin D. But it may be harder for you to get these and other important nutrients. This could be for many reasons. You may not feel as hungry as you used to. Or you could have problems with your teeth or mouth that make it hard to chew. Or you may not enjoy planning and preparing meals, especially if you live alone.  Talk with your doctor if you want help getting the most nutrition from what you eat. The doctor may have you work with a dietitian to help you plan meals.  Follow-up care is a key part of your treatment and safety. Be sure to make and go to all appointments, and call your doctor if you are having problems. It's also a good idea to know your test results and keep a list of the medicines you take.  How can you care for yourself at home?  To stay healthy  Eat a variety of foods. The more you vary the foods you eat, the more vitamins, minerals, and other nutrients you get.  Take a multivitamin every day. Choose one with about 100% of the daily value (DV) for vitamins and minerals. Do not take more than 100% of the daily value for any vitamin or mineral unless your doctor tells you to. Talk with your doctor if you are not " sure which multivitamin is right for you.  Eat lots of fruits and vegetables. Fresh, frozen, or no-salt canned vegetables and fruits in their own juice or light syrup are good choices.  Include foods that are high in vitamin B12 in your diet. Good choices are fortified breakfast cereal, nonfat or low-fat milk and other dairy products, meat, poultry, fish, and eggs.  Get enough calcium and vitamin D. Good choices include nonfat or low-fat milk, cheese, and yogurt. Other good options are tofu, orange juice with added calcium, and some leafy green vegetables, such as cortez greens and kale. If you don't use milk products, talk to your doctor about calcium and vitamin D supplements.  Eat protein foods every day. Good choices include lean meat, fish, poultry, eggs, and cheese. Other good options are cooked beans, peanut butter, and nuts and seeds.  Choose whole grains for half of the grains you eat. Look for 100% whole wheat bread, whole-grain cereals, brown rice, and other whole grains.  If you have constipation  Eat high-fiber foods every day. These include fruits, vegetables, cooked dried beans, and whole grains.  Drink plenty of fluids. If you have kidney, heart, or liver disease and have to limit fluids, talk with your doctor before you increase the amount of fluids you drink.  Ask your doctor if stool softeners may help keep your bowels regular.  If you have mouth problems that make chewing hard  Pick canned or cooked fruits and vegetables. These are often softer.  Chop or shred meat, poultry, and fish. Add sauce or gravy to the meat to help keep it moist.  Pick other protein foods that are soft. These include cheese, peanut butter, cooked beans, cottage cheese, and eggs.  If you have trouble shopping for yourself  Ask a local food store to deliver groceries to your home.  Contact a volunteer center and ask for help.  Ask a family member or neighbor to help you.  If you have trouble preparing meals  If you are  "able, take a cooking class.  Use a microwave oven to cook TV dinners and other frozen or prepared foods.  Take part in group meal programs. You can find these through senior citizen programs.  Have meals brought to your home. Your community may offer programs that deliver meals, such as Meals on Wheels.  If your appetite is poor  Try eating smaller amounts of food more often. For example, eat 4 or 5 small meals a day instead of 1 or 2 large meals.  Eat with family and friends. Or take part in group meal programs offered through volunteer programs. Eating with others may help your appetite. And it helps you be more social.  Ask your doctor if your medicines could cause appetite or taste problems. If so, ask about changing medicines.  Add spices and herbs to increase the flavor of food.  If you think you are depressed, ask your doctor for help. Depression can affect your appetite. And it can make it hard to do everyday activities like grocery shopping and making meals. Treatment can help.  When should you call for help?  Watch closely for changes in your health, and be sure to contact your doctor if you have any problems.  Where can you learn more?  Go to https://www.[employer].net/patiented  Enter L643 in the search box to learn more about \"Nutrition for Older Adults: Care Instructions.\"  Current as of: February 26, 2023               Content Version: 13.8    0217-7797 [employer].   Care instructions adapted under license by your healthcare professional. If you have questions about a medical condition or this instruction, always ask your healthcare professional. [employer] disclaims any warranty or liability for your use of this information.      Learning About Activities of Daily Living  What are activities of daily living?     Activities of daily living (ADLs) are the basic self-care tasks you do every day. As you age, and if you have health problems, you may find that it's harder to do " these things for yourself. That's when you may need some help.  Your doctor uses ADLs to measure how much help you need. Knowing what you can and can't do for yourself is an important first step to getting help. And when you have the help you need, you can stay as independent as possible.  Your doctor will want to know if you are able to do tasks such as:  Take a bath or shower without help.  Go to the bathroom by yourself.  Dress and undress without help.  Shave, comb your hair, and brush teeth on your own.  Get in and out of bed or a chair without help.  Feed yourself without help.  If you are having trouble doing basic self-care tasks, talk with your doctor. You may want to bring a caregiver or family member who can help the doctor understand your needs and abilities.  How will a doctor assess your ADLs?  Asking about ADLs is part of a routine health checkup your doctor will likely do as you age. Your health check might be done in a doctor's office, in your home, or at a hospital. The goal is to find out if you are having any problems that could make your health problems worse or that make it unsafe for you to be on your own.  To measure your ADLs, your doctor will ask how hard it is for you to do routine tasks. He or she may also want to know if you have changed the way you do a task because of a health problem. He or she may watch how you:  Walk back and forth.  Keep your balance while you stand or walk.  Move from sitting to standing or from a bed to a chair.  Button or unbutton a shirt or sweater.  Remove and put on your shoes.  It's normal to feel a little worried or anxious if you find you can't do all the things you used to be able to do. Talking with your doctor about ADLs isn't a test that you either pass or fail. It's just a way to get more information about your health and safety.  Follow-up care is a key part of your treatment and safety. Be sure to make and go to all appointments, and call your  doctor if you are having problems. It's also a good idea to know your test results and keep a list of the medicines you take.  Current as of: February 26, 2023               Content Version: 13.8    8538-1257 [employer].   Care instructions adapted under license by your healthcare professional. If you have questions about a medical condition or this instruction, always ask your healthcare professional. [employer] disclaims any warranty or liability for your use of this information.      Preventive Care Advice   This is general advice given by our system to help you stay healthy. However, your care team may have specific advice just for you. Please talk to your care team about your preventive care needs.  Nutrition  Eat 5 or more servings of fruits and vegetables each day.  Try wheat bread, brown rice and whole grain pasta (instead of white bread, rice, and pasta).  Get enough calcium and vitamin D. Check the label on foods and aim for 100% of the RDA (recommended daily allowance).  Lifestyle  Exercise at least 150 minutes each week  (30 minutes a day, 5 days a week).  Do muscle strengthening activities 2 days a week. These help control your weight and prevent disease.  No smoking.  Wear sunscreen to prevent skin cancer.  Have a dental exam and cleaning every 6 months.  Yearly exams  See your health care team every year to talk about:  Any changes in your health.  Any medicines your care team has prescribed.  Preventive care, family planning, and ways to prevent chronic diseases.  Shots (vaccines)   HPV shots (up to age 26), if you've never had them before.  Hepatitis B shots (up to age 59), if you've never had them before.  COVID-19 shot: Get this shot when it's due.  Flu shot: Get a flu shot every year.  Tetanus shot: Get a tetanus shot every 10 years.  Pneumococcal, hepatitis A, and RSV shots: Ask your care team if you need these based on your risk.  Shingles shot (for age 50 and  up)  General health tests  Diabetes screening:  Starting at age 35, Get screened for diabetes at least every 3 years.  If you are younger than age 35, ask your care team if you should be screened for diabetes.  Cholesterol test: At age 39, start having a cholesterol test every 5 years, or more often if advised.  Bone density scan (DEXA): At age 50, ask your care team if you should have this scan for osteoporosis (brittle bones).  Hepatitis C: Get tested at least once in your life.  STIs (sexually transmitted infections)  Before age 24: Ask your care team if you should be screened for STIs.  After age 24: Get screened for STIs if you're at risk. You are at risk for STIs (including HIV) if:  You are sexually active with more than one person.  You don't use condoms every time.  You or a partner was diagnosed with a sexually transmitted infection.  If you are at risk for HIV, ask about PrEP medicine to prevent HIV.  Get tested for HIV at least once in your life, whether you are at risk for HIV or not.  Cancer screening tests  Cervical cancer screening: If you have a cervix, begin getting regular cervical cancer screening tests starting at age 21.  Breast cancer scan (mammogram): If you've ever had breasts, begin having regular mammograms starting at age 40. This is a scan to check for breast cancer.  Colon cancer screening: It is important to start screening for colon cancer at age 45.  Have a colonoscopy test every 10 years (or more often if you're at risk) Or, ask your provider about stool tests like a FIT test every year or Cologuard test every 3 years.  To learn more about your testing options, visit:   [email or web address].  For help making a decision, visit:   https://bit.ly/we51907.  Prostate cancer screening test: If you have a prostate, ask your care team if a prostate cancer screening test (PSA) at age 55 is right for you.  Lung cancer screening: If you are a current or former smoker ages 50 to  80, ask your care team if ongoing lung cancer screenings are right for you.  For informational purposes only. Not to replace the advice of your health care provider. Copyright   2023 [employer]. All rights reserved. Clinically reviewed by the [facility] Transitions Program. [employer] 112604 - REV 01/24.

## 2024-02-02 NOTE — PROGRESS NOTES
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     (I10) Benign essential hypertension  Comment: At goal  The current medical regimen is effective;  continue present plan and medications.    Plan: Comprehensive metabolic panel (BMP + Alb, Alk         Phos, ALT, AST, Total. Bili, TP), Albumin         Random Urine Quantitative with Creat Ratio          (E78.5) Hyperlipidemia LDL goal <130  Comment:   LDL Cholesterol Calculated   Date Value Ref Range Status   01/19/2023 77 <=100 mg/dL Final   01/14/2020 48 <100 mg/dL Final     Comment:     Desirable:       <100 mg/dl    At goal  The current medical regimen is effective;  continue present plan and medications.      Plan: Comprehensive metabolic panel (BMP + Alb, Alk         Phos, ALT, AST, Total. Bili, TP), Lipid panel         reflex to direct LDL Fasting, atorvastatin         (LIPITOR) 40 MG tablet          (J45.30) Mild persistent asthma without complication  At goal  The current medical regimen is effective;  continue present plan and medications.        BMI  Estimated body mass index is 25.9 kg/m  as calculated from the following:    Height as of this encounter: 1.835 m (6' 0.24\").    Weight as of this encounter: 87.2 kg (192 lb 4.8 oz).     Strong work has lost 30 lbs with Noom    Counseling  Appropriate preventive services were discussed with this patient, including applicable screening as appropriate for fall prevention, nutrition, physical activity, Tobacco-use cessation, weight loss and cognition.  Checklist reviewing preventive services available has been given to the patient.  Reviewed patient's diet, " addressing concerns and/or questions.   Patient reported safety concerns were addressed today.  Patient has been advised of split billing requirements and indicates understanding: Yes    Subjective   Tank is a 74 year old, presenting for the following:  Annual Visit  Chronic disease management, acute concerns      Onchomyosis  He's been using over the counter penlac intermittently for a few months, no results. He would like to continuet this, will let me know if he wants to start medication.    Hyperlipidemia Follow-Up    Are you regularly taking any medication or supplement to lower your cholesterol?   Yes-    Are you having muscle aches or other side effects that you think could be caused by your cholesterol lowering medication?  No    Hypertension Follow-up    No concerns today, no side effects from medication, no symptoms today.    Asthma Follow-Up    Was ACT completed today?  Yes        2/1/2024    11:45 AM   ACT Total Scores   ACT TOTAL SCORE (Goal Greater than or Equal to 20) 25   In the past 12 months, how many times did you visit the emergency room for your asthma without being admitted to the hospital? 0   In the past 12 months, how many times were you hospitalized overnight because of your asthma? 0        How many days per week do you miss taking your asthma controller medication?  0  Please describe any recent triggers for your asthma: None  Have you had any Emergency Room Visits, Urgen  t Care Visits, or Hospital Admissions since your last office visit?  No     Right renal aneurysm  He's has this for over 8 years, noted incidentally as below, no acute symptoms or concerns    I reviewed chart, see summary below   11/4/2016  (I72.2) Renal artery aneurysm (HCC)  (primary encounter diagnosis)  Comment: this is asx, it was incidentally discovered; not likely alexis be growing  Plan: repeat CTA in 12 months, if > 20 mm then vein map, refer to vascular surgery for open renal artery bypass; distal location  excludes EV repair; pt told to go to ER for any flank pain, discoloration, hematuria, testicular pain; if no change in size at next imaging, subsequently image one year later    CTA 10/28/2016:  IMPRESSION:  1. Right renal arterial aneurysm at the renal hilum, trifurcation of  the main right renal artery. Size is 1.7 x 1.7 cm today, previously  1.5 x 1.6 cm. Size may have minimally increased, though size  difference this small could also be related to slight different  location of slice. Either way, recommend continued periodic  surveillance. This is a complex aneurysm for treatment considerations.  2. No additional or new areas of aneurysm. Both kidneys remain  normally perfused.    Liver Function Studies -   Recent Labs   Lab Test 01/19/23  0921   PROTTOTAL 7.3   ALBUMIN 4.2   BILITOTAL 0.6   ALKPHOS 57   AST 37   ALT 45           2/2/2024     8:51 AM   Additional Questions   Roomed by Shadi   Accompanied by self         Health Care Directive  Patient does not have a Health Care Directive or Living Will: Patient states has Advance Directive and will bring in a copy to clinic.    HPI        2/1/2024   General Health   How would you rate your overall physical health? Excellent   Feel stress (tense, anxious, or unable to sleep) Not at all         2/1/2024   Nutrition   Diet: Other   If other, please elaborate: Since July 2023 I've been following the weight loss/exercise program, Noom.  I've increased exercise through 1-2-mile walks nearly every day and have been logging meals and using the program's recommended calorie intake range. I've dropped 30 pounds over the six months and am now in the maintenance phase.         2/1/2024   Exercise   Days per week of moderate/strenous exercise 5 days   Average minutes spent exercising at this level 50 min         2/1/2024   Social Factors   Frequency of gathering with friends or relatives Three times a week   Worry food won't last until get money to buy more No   Food not last  or not have enough money for food? No   Do you have housing?  Yes   Are you worried about losing your housing? No   Lack of transportation? No   Unable to get utilities (heat,electricity)? No         2/1/2024   Fall Risk   Fallen 2 or more times in the past year? No    No   Trouble with walking or balance? No          2/1/2024   Activities of Daily Living- Home Safety   Needs help with the following daily activites None of the above   Safety concerns in the home Throw rugs in the hallway    No grab bars in the bathroom         2/1/2024   Dental   Dentist two times every year? Yes         2/1/2024   Hearing Screening   Hearing concerns? None of the above         2/1/2024   Driving Risk Screening   Patient/family members have concerns about driving No         2/1/2024   General Alertness/Fatigue Screening   Have you been more tired than usual lately? No         2/1/2024   Urinary Incontinence Screening   Bothered by leaking urine in past 6 months No         2/1/2024   TB Screening   Were you born outside of [place]?  No         Today's PHQ-2 Score:       2/1/2024    11:44 AM   PHQ-2 ( 1999 [employer])   Q1: Little interest or pleasure in doing things 0   Q2: Feeling down, depressed or hopeless 0   PHQ-2 Score 0   Q1: Little interest or pleasure in doing things Not at all   Q2: Feeling down, depressed or hopeless Not at all   PHQ-2 Score 0           2/1/2024   Substance Use   Alcohol more than 3/day or more than 7/wk Not Applicable   Do you have a current opioid prescription? No   How severe/bad is pain from 1 to 10? 1/10   Do you use any other substances recreationally? No     Social History     Tobacco Use    Smoking status: Never    Smokeless tobacco: Never   Vaping Use    Vaping Use: Never used   Substance Use Topics    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     Drug use: No           2/1/2024   AAA Screening   Family history of Abdominal Aortic Aneurysm (AAA)? Unsure   The 10-year ASCVD risk score (Tito ALVARADO, et al., 2019)  is: 24.6%    Values used to calculate the score:      Age: 74 years      Sex: Male      Is Non- : No      Diabetic: No      Tobacco smoker: No      Systolic Blood Pressure: 128 mmHg      Is BP treated: Yes      HDL Cholesterol: 46 mg/dL      Total Cholesterol: 143 mg/dL          Reviewed and updated as needed this visit by Provider      Problems               Past Medical History:   Diagnosis Date    Aneurysm of right renal artery (H24) 2015    Benign essential hypertension 1/14/2020    Chronic back pain 2007    currently seeing chiropractor at [facility] with Dr. Connell.     Uncomplicated asthma      Past Surgical History:   Procedure Laterality Date    COLONOSCOPY  2012    HERNIA REPAIR  1960     Current providers sharing in care for this patient include:  Patient Care Team:  Vaishali Car MD as PCP - General (Family Medicine)  Vaishali Car MD as Assigned PCP  Robinson Charles MD as Assigned Surgical Provider    The following health maintenance items are reviewed in Epic and correct as of today:  Health Maintenance   Topic Date Due    HEPATITIS A IMMUNIZATION (1 of 2 - Risk 2-dose series) Never done    HEPATITIS B IMMUNIZATION (1 of 3 - Risk 3-dose series) Never done    CT CHEST W/O CONTRAST  01/28/2022    CMP  01/19/2024    LIPID  01/19/2024    MICROALBUMIN  01/19/2024    ASTHMA CONTROL TEST  08/02/2024    MEDICARE ANNUAL WELLNESS VISIT  02/02/2025    ANNUAL REVIEW OF HM ORDERS  02/02/2025    ASTHMA ACTION PLAN  02/02/2025    FALL RISK ASSESSMENT  02/02/2025    COLORECTAL CANCER SCREENING  03/21/2025    GLUCOSE  01/19/2026    ADVANCE CARE PLANNING  01/19/2028    DTAP/TDAP/TD IMMUNIZATION (2 - Td or Tdap) 11/02/2028    HEPATITIS C SCREENING  Completed    PHQ-2 (once per calendar year)  Completed    INFLUENZA VACCINE  Completed    Pneumococcal Vaccine: 65+ Years  Completed    ZOSTER IMMUNIZATION  Completed    RSV VACCINE (Pregnancy & 60+)  Completed     "COVID-19 Vaccine  Completed    IPV IMMUNIZATION  Aged Out    HPV IMMUNIZATION  Aged Out    MENINGITIS IMMUNIZATION  Aged Out    RSV MONOCLONAL ANTIBODY  Aged Out     Review of Systems    Review of Systems  Constitutional, neuro, ENT, endocrine, pulmonary, cardiac, gastrointestinal, genitourinary, musculoskeletal, integument and psychiatric systems are negative, except as otherwise noted.     Objective    Exam  /70 (BP Location: Right arm, Patient Position: Sitting, Cuff Size: Adult Regular)   Pulse 55   Temp 97.5  F (36.4  C) (Temporal)   Resp 16   Ht 1.835 m (6' 0.24\")   Wt 87.2 kg (192 lb 4.8 oz)   SpO2 98%   BMI 25.90 kg/m     Estimated body mass index is 25.9 kg/m  as calculated from the following:    Height as of this encounter: 1.835 m (6' 0.24\").    Weight as of this encounter: 87.2 kg (192 lb 4.8 oz).  Wt Readings from Last 4 Encounters:   02/02/24 87.2 kg (192 lb 4.8 oz)   08/21/23 95.5 kg (210 lb 8 oz)   01/19/23 99 kg (218 lb 4 oz)   12/02/21 97.1 kg (214 lb)     He's lost 30 lbs with Noom    Physical Exam  GENERAL: alert and no distress  EYES: Eyes grossly normal to inspection, PERRL and conjunctivae and sclerae normal  HENT: ear canals and TM's normal, nose and mouth without ulcers or lesions  NECK: no adenopathy, no asymmetry, masses, or scars  RESP: lungs clear to auscultation - no rales, rhonchi or wheezes  CV: regular rate and rhythm, normal S1 S2, no S3 or S4, no murmur, click or rub, no peripheral edema  ABDOMEN: soft, nontender, no hepatosplenomegaly, no masses and bowel sounds normal  MS: no gross musculoskeletal defects noted, no edema  SKIN: no suspicious lesions or rashes  NEURO: Normal strength and tone, mentation intact and speech normal  PSYCH: mentation appears normal, affect normal/bright  LYMPH: no cervical, supraclavicular, axillary, or inguinal adenopathy         2/2/2024   Mini Cog   Clock Draw Score 2 Normal   3 Item Recall 3 objects recalled   Mini Cog Total Score 5 "            Signed Electronically by: Vaishali Car MD

## 2024-02-02 NOTE — LETTER
My Asthma Action Plan    Name: Sulaiman Marroquin   YOB: 1950  Date: 2/2/2024   My doctor: Vaishali Car MD   My clinic: [facility]        My Control Medicine: Fluticasone furoate + vilanterol (Breo Ellipta)  -  100/25mcg    My Rescue Medicine: none   My Asthma Severity:   Mild Persistent  Know your asthma triggers:   None            GREEN ZONE   Good Control  I feel good  No cough or wheeze  Can work, sleep and play without asthma symptoms       Take your asthma control medicine every day.     If exercise triggers your asthma, take your rescue medication  15 minutes before exercise or sports, and  During exercise if you have asthma symptoms  Spacer to use with inhaler: If you have a spacer, make sure to use it with your inhaler             YELLOW ZONE Getting Worse  I have ANY of these:  I do not feel good  Cough or wheeze  Chest feels tight  Wake up at night   Keep taking your Green Zone medications  Start taking your rescue medicine:  every 20 minutes for up to 1 hour. Then every 4 hours for 24-48 hours.  If you stay in the Yellow Zone for more than 12-24 hours, contact your doctor.  If you do not return to the Green Zone in 12-24 hours or you get worse, start taking your oral steroid medicine if prescribed by your provider.           RED ZONE Medical Alert - Get Help  I have ANY of these:  I feel awful  Medicine is not helping  Breathing getting harder  Trouble walking or talking  Nose opens wide to breathe       Take your rescue medicine NOW  If your provider has prescribed an oral steroid medicine, start taking it NOW  Call your doctor NOW  If you are still in the Red Zone after 20 minutes and you have not reached your doctor:  Take your rescue medicine again and  Call 911 or go to the emergency room right away    See your regular doctor within 2 weeks of an Emergency Room or Urgent Care visit for follow-up treatment.          Annual Reminders:  Meet with  Asthma Educator,  Flu Shot in the Fall, consider Pneumonia Vaccination for patients with asthma (aged 19 and older).    Pharmacy:    [id number] - [place], [place] - [address] [facility] & [address] [place] [address] [facility] STORE #[id number] - [place], [place] - [address] AT [facility] [address]    Electronically signed by Vaishali Car MD   Date: 02/02/24                      Asthma Triggers  How To Control Things That Make Your Asthma Worse    Triggers are things that make your asthma worse.  Look at the list below to help you find your triggers and what you can do about them.  You can help prevent asthma flare-ups by staying away from your triggers.      Trigger                                                          What you can do   Cigarette Smoke  Tobacco smoke can make asthma worse. Do not allow smoking in your home, car or around you.  Be sure no one smokes at a child s day care or school.  If you smoke, ask your health care provider for ways to help you quit.  Ask family members to quit too.  Ask your health care provider for a referral to Quit Plan to help you quit smoking, or call 5-391-647-PLAN.     Colds, Flu, Bronchitis  These are common triggers of asthma. Wash your hands often.  Don t touch your eyes, nose or mouth.  Get a flu shot every year.     Dust Mites  These are tiny bugs that live in cloth or carpet. They are too small to see. Wash sheets and blankets in hot water every week.   Encase pillows and mattress in dust mite proof covers.  Avoid having carpet if you can. If you have carpet, vacuum weekly.   Use a dust mask and HEPA vacuum.   Pollen and Outdoor Mold  Some people are allergic to trees, grass, or weed pollen, or molds. Try to keep your windows closed.  Limit time out doors when pollen count is high.   Ask you health care provider about taking medicine during allergy season.     Animal Dander  Some people are allergic to skin flakes, urine or saliva from  pets with fur or feathers. Keep pets with fur or feathers out of your home.    If you can t keep the pet outdoors, then keep the pet out of your bedroom.  Keep the bedroom door closed.  Keep pets off cloth furniture and away from stuffed toys.     Mice, Rats, and Cockroaches   Some people are allergic to the waste from these pests.   Cover food and garbage.  Clean up spills and food crumbs.  Store grease in the refrigerator.   Keep food out of the bedroom.   Indoor Mold  This can be a trigger if your home has high moisture. Fix leaking faucets, pipes, or other sources of water.   Clean moldy surfaces.  Dehumidify basement if it is damp and smelly.   Smoke, Strong Odors, and Sprays  These can reduce air quality. Stay away from strong odors and sprays, such as perfume, powder, hair spray, paints, smoke incense, paint, cleaning products, candles and new carpet.   Exercise or Sports  Some people with asthma have this trigger. Be active!  Ask your doctor about taking medicine before sports or exercise to prevent symptoms.    Warm up for 5-10 minutes before and after sports or exercise.     Other Triggers of Asthma  Cold air:  Cover your nose and mouth with a scarf.  Sometimes laughing or crying can be a trigger.  Some medicines and food can trigger asthma.

## 2024-02-05 NOTE — RESULT ENCOUNTER NOTE
Anderson Fu,  Thanks for coming to clinic.  The clinician who ordered these tests is currently out of the office, but we wanted to let you know that your results have been reviewed and there are no urgent findings.      Your clinician will review your results upon their return and may get back to you with further information if needed.      Danielle Hearn MD / [facility]

## 2024-02-06 DIAGNOSIS — E78.5 HYPERLIPIDEMIA LDL GOAL <130: ICD-10-CM

## 2024-02-07 RX ORDER — ATORVASTATIN CALCIUM 40 MG/1
40 TABLET, FILM COATED ORAL DAILY
Qty: 90 TABLET | Refills: 3 | OUTPATIENT
Start: 2024-02-07

## 2024-02-15 ENCOUNTER — HOSPITAL ENCOUNTER (OUTPATIENT)
Dept: CT IMAGING | Facility: CLINIC | Age: 74
Discharge: HOME | End: 2024-02-15
Attending: FAMILY MEDICINE | Admitting: FAMILY MEDICINE
Payer: COMMERCIAL

## 2024-02-15 DIAGNOSIS — I72.2 ANEURYSM OF RIGHT RENAL ARTERY (H): ICD-10-CM

## 2024-02-15 PROCEDURE — 250N000009 HC RX 250: Performed by: FAMILY MEDICINE

## 2024-02-15 PROCEDURE — 250N000011 HC RX IP 250 OP 636: Performed by: FAMILY MEDICINE

## 2024-02-15 PROCEDURE — 74174 CTA ABD&PLVS W/CONTRAST: CPT

## 2024-02-15 RX ORDER — IOPAMIDOL 755 MG/ML
72 INJECTION, SOLUTION INTRAVASCULAR ONCE
Status: COMPLETED | OUTPATIENT
Start: 2024-02-15 | End: 2024-02-15

## 2024-02-15 RX ADMIN — SODIUM CHLORIDE 80 ML: 9 INJECTION, SOLUTION INTRAVENOUS at 16:52

## 2024-02-15 RX ADMIN — IOPAMIDOL 72 ML: 755 INJECTION, SOLUTION INTRAVENOUS at 16:52

## 2024-02-16 NOTE — RESULT ENCOUNTER NOTE
Thank you for getting your CT done!  Great news, the aneurysm has not changed in size at all!  I do recommend repeating the imaging at perhaps 1 or 2 year intervals to keep an eye on this, but you don't need any interventions right now.    Sincerely,  Dr. Vaishali Car MD  2/15/2024

## 2024-04-06 ENCOUNTER — HEALTH MAINTENANCE LETTER (OUTPATIENT)
Age: 74
End: 2024-04-06

## 2024-05-15 DIAGNOSIS — I10 BENIGN ESSENTIAL HYPERTENSION: ICD-10-CM

## 2024-05-15 RX ORDER — LISINOPRIL 5 MG/1
5 TABLET ORAL DAILY
Qty: 90 TABLET | Refills: 1 | Status: SHIPPED | OUTPATIENT
Start: 2024-05-15

## 2024-10-29 DIAGNOSIS — J45.30 MILD PERSISTENT ASTHMA WITHOUT COMPLICATION: ICD-10-CM

## 2024-10-30 ENCOUNTER — TRANSFERRED RECORDS (OUTPATIENT)
Dept: HEALTH INFORMATION MANAGEMENT | Facility: CLINIC | Age: 74
End: 2024-10-30

## 2024-10-31 RX ORDER — FLUTICASONE FUROATE AND VILANTEROL 100; 25 UG/1; UG/1
1 POWDER RESPIRATORY (INHALATION) DAILY
Qty: 60 EACH | Refills: 2 | Status: SHIPPED | OUTPATIENT
Start: 2024-10-31

## 2024-11-04 ENCOUNTER — TELEPHONE (OUTPATIENT)
Dept: FAMILY MEDICINE | Facility: CLINIC | Age: 74
End: 2024-11-04
Payer: COMMERCIAL

## 2024-11-04 NOTE — TELEPHONE ENCOUNTER
Patient calls back inquiring about status of prior authorization request. Writer inform caller message was sent to provider- awaiting for response. Writer will send provider another message. Caller verbalizes understanding.     Augustina Mixon,   [facility]  November 4, 2024 2:43 PM

## 2024-11-05 NOTE — TELEPHONE ENCOUNTER
PRIOR AUTHORIZATION DENIED    Medication: FLUTICASONE FUROATE-VILANTEROL 100-25 MCG/ACT IN AEPB  Insurance Company: [employer] [place] - Phone [phone] Fax [phone]  Denial Date: 11/1/2024  Denial Reason(s):       Appeal Information:       Patient Notified: No

## 2024-11-07 NOTE — TELEPHONE ENCOUNTER
Thank you , Natasha Lord was prescribed for him 10/31/2024.    Sincerely,  Dr. Vaishali Car MD  11/7/2024

## 2024-11-25 DIAGNOSIS — I10 BENIGN ESSENTIAL HYPERTENSION: ICD-10-CM

## 2024-11-26 RX ORDER — LISINOPRIL 5 MG/1
5 TABLET ORAL DAILY
Qty: 90 TABLET | Refills: 0 | Status: SHIPPED | OUTPATIENT
Start: 2024-11-26

## 2025-02-24 DIAGNOSIS — I10 BENIGN ESSENTIAL HYPERTENSION: ICD-10-CM

## 2025-02-25 RX ORDER — LISINOPRIL 5 MG/1
5 TABLET ORAL DAILY
Qty: 60 TABLET | Refills: 0 | Status: SHIPPED | OUTPATIENT
Start: 2025-02-25

## 2025-02-25 NOTE — TELEPHONE ENCOUNTER
Clinic care team-please contact patient to schedule annual wellness visit.    Thank you!  MANJIT DriscollN, RN-[facility]     11-Aug-2023 10:54

## 2025-04-28 DIAGNOSIS — I10 BENIGN ESSENTIAL HYPERTENSION: ICD-10-CM

## 2025-04-29 RX ORDER — LISINOPRIL 5 MG/1
5 TABLET ORAL DAILY
Qty: 90 TABLET | Refills: 3 | Status: SHIPPED | OUTPATIENT
Start: 2025-04-29

## 2025-05-28 SDOH — HEALTH STABILITY: PHYSICAL HEALTH: ON AVERAGE, HOW MANY MINUTES DO YOU ENGAGE IN EXERCISE AT THIS LEVEL?: 20 MIN

## 2025-05-28 SDOH — HEALTH STABILITY: PHYSICAL HEALTH: ON AVERAGE, HOW MANY DAYS PER WEEK DO YOU ENGAGE IN MODERATE TO STRENUOUS EXERCISE (LIKE A BRISK WALK)?: 3 DAYS

## 2025-05-28 ASSESSMENT — ASTHMA QUESTIONNAIRES
QUESTION_4 LAST FOUR WEEKS HOW OFTEN HAVE YOU USED YOUR RESCUE INHALER OR NEBULIZER MEDICATION (SUCH AS ALBUTEROL): ONCE A WEEK OR LESS
QUESTION_2 LAST FOUR WEEKS HOW OFTEN HAVE YOU HAD SHORTNESS OF BREATH: ONCE OR TWICE A WEEK
QUESTION_5 LAST FOUR WEEKS HOW WOULD YOU RATE YOUR ASTHMA CONTROL: SOMEWHAT CONTROLLED
ACT_TOTALSCORE: 21
QUESTION_3 LAST FOUR WEEKS HOW OFTEN DID YOUR ASTHMA SYMPTOMS (WHEEZING, COUGHING, SHORTNESS OF BREATH, CHEST TIGHTNESS OR PAIN) WAKE YOU UP AT NIGHT OR EARLIER THAN USUAL IN THE MORNING: NOT AT ALL
QUESTION_1 LAST FOUR WEEKS HOW MUCH OF THE TIME DID YOUR ASTHMA KEEP YOU FROM GETTING AS MUCH DONE AT WORK, SCHOOL OR AT HOME: NONE OF THE TIME

## 2025-05-28 ASSESSMENT — SOCIAL DETERMINANTS OF HEALTH (SDOH): HOW OFTEN DO YOU GET TOGETHER WITH FRIENDS OR RELATIVES?: THREE TIMES A WEEK

## 2025-05-29 ENCOUNTER — OFFICE VISIT (OUTPATIENT)
Dept: FAMILY MEDICINE | Facility: CLINIC | Age: 75
End: 2025-05-29
Payer: COMMERCIAL

## 2025-05-29 VITALS
OXYGEN SATURATION: 95 % | TEMPERATURE: 97.1 F | HEIGHT: 72 IN | RESPIRATION RATE: 14 BRPM | WEIGHT: 206.9 LBS | DIASTOLIC BLOOD PRESSURE: 68 MMHG | BODY MASS INDEX: 28.02 KG/M2 | HEART RATE: 61 BPM | SYSTOLIC BLOOD PRESSURE: 116 MMHG

## 2025-05-29 DIAGNOSIS — G47.62 LEG CRAMPS, SLEEP RELATED: ICD-10-CM

## 2025-05-29 DIAGNOSIS — E78.5 HYPERLIPIDEMIA LDL GOAL <130: ICD-10-CM

## 2025-05-29 DIAGNOSIS — J45.30 MILD PERSISTENT ASTHMA WITHOUT COMPLICATION: ICD-10-CM

## 2025-05-29 DIAGNOSIS — I10 BENIGN ESSENTIAL HYPERTENSION: ICD-10-CM

## 2025-05-29 DIAGNOSIS — Z00.00 ENCOUNTER FOR MEDICARE ANNUAL WELLNESS EXAM: Primary | ICD-10-CM

## 2025-05-29 RX ORDER — BUDESONIDE AND FORMOTEROL FUMARATE DIHYDRATE 160; 4.5 UG/1; UG/1
AEROSOL RESPIRATORY (INHALATION)
Qty: 20.4 G | Refills: 2 | Status: SHIPPED | OUTPATIENT
Start: 2025-05-29

## 2025-05-29 RX ORDER — FLUTICASONE FUROATE AND VILANTEROL 100; 25 UG/1; UG/1
1 POWDER RESPIRATORY (INHALATION) DAILY
Qty: 60 EACH | Refills: 2 | Status: CANCELLED | OUTPATIENT
Start: 2025-05-29

## 2025-05-29 ASSESSMENT — PAIN SCALES - GENERAL: PAINLEVEL_OUTOF10: MILD PAIN (2)

## 2025-05-29 NOTE — PROGRESS NOTES
"Preventive Care Visit  [facility]  Vaishali Car MD, Family Medicine  May 29, 2025      Assessment & Plan     (Z00.00) Encounter for Medicare annual wellness exam  (primary encounter diagnosis)    (I10) Benign essential hypertension  Comment: At goal  The current medical regimen is effective;  continue present plan and medications.      (E78.5) Hyperlipidemia LDL goal <130  Comment:   LDL Cholesterol Calculated   Date Value Ref Range Status   02/02/2024 82 <=100 mg/dL Final   01/14/2020 48 <100 mg/dL Final     Comment:     Desirable:       <100 mg/dl      Plan: At goal  The current medical regimen is effective;  continue present plan and medications.      (J45.30) Mild persistent asthma without complication  Comment: acute worsening because his controller inhaler Breo Ellipta, which he's been using for years, is no longer covered and he wasn't able to fill it. Will attempt to prescribe alternative to see if this is covered.  Plan: budesonide-formoterol (SYMBICORT/BREYNA)         160-4.5 MCG/ACT inhaler          (G47.62) Leg cramps, sleep related  Comment: new concern, will check magnesium levels today  Plan: consider soaking in Epsom bath salt at bedtime.    Patient has been advised of split billing requirements and indicates understanding: Yes    BMI  Estimated body mass index is 28.04 kg/m  as calculated from the following:    Height as of this encounter: 1.83 m (6' 0.03\").    Weight as of this encounter: 93.8 kg (206 lb 14.4 oz).       Counseling  Appropriate preventive services were addressed with this patient via screening, questionnaire, or discussion as appropriate for fall prevention, nutrition, physical activity, Tobacco-use cessation, social engagement, weight loss and cognition.  Checklist reviewing preventive services available has been given to the patient.  Reviewed patient's diet, addressing concerns and/or questions.   He is at risk for lack of exercise and has " been provided with information to increase physical activity for the benefit of his well-being.   He is at risk for psychosocial distress and has been provided with information to reduce risk.   Patient reported safety concerns were addressed today.    Eddie Fu is a 75 year old, presenting for the following:  Annual Visit (PT stated that he has been having leg cramping at night, want to discuss about his lower back pain, PT is having mild asthmas and he wants to get some medications renew.  )        5/29/2025     7:32 AM   Additional Questions   Roomed by Pamela Edwards   Accompanied by Self        HPI  Low back pain   He's been seeing a chiropracter for pain related that seems to be related to muscle spasm, no numbness/tingling down his legs.    Hyperlipidemia Follow-Up    Are you regularly taking any medication or supplement to lower your cholesterol?   Yes- atorvastatin   Are you having muscle aches or other side effects that you think could be caused by your cholesterol lowering medication?  No    Hypertension Follow-up    Do you check your blood pressure regularly outside of the clinic? No   Are you following a low salt diet? Yes  He tries to get regular exercise.  Are your blood pressures ever more than 140 on the top number (systolic) OR more   than 90 on the bottom number (diastolic), for example 140/90? No    BP Readings from Last 2 Encounters:   05/29/25 116/68   02/02/24 128/70     Asthma      5/28/2025     2:05 PM   ACT Total Scores   ACT TOTAL SCORE (Goal Greater than or Equal to 20) 21    In the past 12 months, how many times did you visit the emergency room for your asthma without being admitted to the hospital? 0   In the past 12 months, how many times were you hospitalized overnight because of your asthma? 0       Patient-reported     Do you have any of the following symptoms? None of these symptoms (cough/noisy breathing/trouble with breathing)  What makes your asthma/breathing  worse?  Pollens  Do you want more information about how to use your inhaler? No    Leg cramps  Newer problem, has cramps at night if he bends his knees, relieved with stretching. symptoms not apparently related to new activity or new shoes. He's been using magnesium glycinate which has helped.    Advance Care Planning    Health Care Directive received at today's visit.  Forwarded to [employer].        5/28/2025   General Health   How would you rate your overall physical health? Good   Feel stress (tense, anxious, or unable to sleep) Rather much   (!) STRESS CONCERN      5/28/2025   Nutrition   Diet: Regular (no restrictions)         5/28/2025   Exercise   Days per week of moderate/strenous exercise 3 days   Average minutes spent exercising at this level 20 min         5/28/2025   Social Factors   Frequency of gathering with friends or relatives Three times a week   Worry food won't last until get money to buy more No   Food not last or not have enough money for food? No   Do you have housing? (Housing is defined as stable permanent housing and does not include staying outside in a car, in a tent, in an abandoned building, in an overnight shelter, or couch-surfing.) Yes   Are you worried about losing your housing? No   Lack of transportation? No   Unable to get utilities (heat,electricity)? No         5/28/2025   Fall Risk   Fallen 2 or more times in the past year? No    No   Trouble with walking or balance? No    No       Multiple values from one day are sorted in reverse-chronological order          5/28/2025   Activities of Daily Living- Home Safety   Needs help with the following daily activites None of the above   Safety concerns in the home Throw rugs in the hallway    No grab bars in the bathroom       Multiple values from one day are sorted in reverse-chronological order         5/28/2025   Dental   Dentist two times every year? Yes         5/28/2025   Hearing Screening   Hearing concerns? None of the  above         5/28/2025   Driving Risk Screening   Patient/family members have concerns about driving No         5/28/2025   General Alertness/Fatigue Screening   Have you been more tired than usual lately? No         5/28/2025   Urinary Incontinence Screening   Bothered by leaking urine in past 6 months No         Today's PHQ-2 Score:       5/28/2025     2:03 PM   PHQ-2 ( 1999 [employer])   Q1: Little interest or pleasure in doing things 0   Q2: Feeling down, depressed or hopeless 1   PHQ-2 Score 1    Q1: Little interest or pleasure in doing things Not at all   Q2: Feeling down, depressed or hopeless Several days   PHQ-2 Score 1       Patient-reported           5/28/2025   Substance Use   Alcohol more than 3/day or more than 7/wk Not Applicable   Do you have a current opioid prescription? No   How severe/bad is pain from 1 to 10? 1/10   Do you use any other substances recreationally? No     Social History     Tobacco Use    Smoking status: Never    Smokeless tobacco: Never   Vaping Use    Vaping status: Never Used   Substance Use Topics    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     Drug use: No           5/28/2025   AAA Screening   Family history of Abdominal Aortic Aneurysm (AAA)? Unsure   ASCVD Risk   The 10-year ASCVD risk score (Tito ALVARADO, et al., 2019) is: 22.1%    Values used to calculate the score:      Age: 75 years      Sex: Male      Is Non- : No      Diabetic: No      Tobacco smoker: No      Systolic Blood Pressure: 116 mmHg      Is BP treated: Yes      HDL Cholesterol: 51 mg/dL      Total Cholesterol: 147 mg/dL            Reviewed and updated as needed this visit by Provider    Allergies  Meds  Problems               Past Medical History:   Diagnosis Date    Aneurysm of right renal artery 2015    Benign essential hypertension 1/14/2020    Chronic back pain 2007    currently seeing [facility]ropractor [facility] with Dr. Connell.     Uncomplicated asthma      Past  "Surgical History:   Procedure Laterality Date    COLONOSCOPY  2012    HERNIA REPAIR  1960     Current providers sharing in care for this patient include:  Patient Care Team:  Vaishali Car MD as PCP - General (Family Medicine)  Vaishali Car MD as Assigned PCP    The following health maintenance items are reviewed in Epic and correct as of today:  Health Maintenance   Topic Date Due    HEPATITIS A VACCINE (1 of 2 - Risk 2-dose series) Never done    HEPATITIS B VACCINE (1 of 3 - Risk 3-dose series) Never done    CT CHEST W/O CONTRAST  01/28/2022    BMP  02/02/2025    CMP  02/02/2025    LIPID  02/02/2025    MICROALBUMIN  02/02/2025    ANNUAL REVIEW OF HM ORDERS  02/02/2025    ASTHMA ACTION PLAN  02/02/2025    COVID-19 VACCINE (10 - 2024-25 season) 05/02/2025    ASTHMA CONTROL TEST  11/29/2025    MEDICARE ANNUAL WELLNESS VISIT  05/29/2026    FALL RISK ASSESSMENT  05/29/2026    DIABETES SCREENING  02/02/2027    COLORECTAL CANCER SCREENING  10/24/2027    DTAP/TDAP/TD VACCINE (2 - Td or Tdap) 11/02/2028    ADVANCE CARE PLANNING  05/29/2030    HEPATITIS C SCREENING  Completed    PHQ-2 (once per calendar year)  Completed    INFLUENZA VACCINE  Completed    PNEUMOCOCCAL VACCINE 50+ YEARS  Completed    ZOSTER VACCINE  Completed    RSV VACCINE  Completed    HPV VACCINE  Aged Out    MENINGITIS VACCINE  Aged Out         Review of Systems  Constitutional, HEENT, cardiovascular, pulmonary, gi and gu systems are negative, except as otherwise noted.     Objective    Exam  /68 (BP Location: Right arm, Patient Position: Sitting, Cuff Size: Adult Regular)   Pulse 61   Temp 97.1  F (36.2  C) (Temporal)   Resp 14   Ht 1.83 m (6' 0.03\")   Wt 93.8 kg (206 lb 14.4 oz)   SpO2 95%   BMI 28.04 kg/m     Estimated body mass index is 28.04 kg/m  as calculated from the following:    Height as of this encounter: 1.83 m (6' 0.03\").    Weight as of this encounter: 93.8 kg (206 lb 14.4 oz).    Physical Exam  GENERAL: " alert and no distress  EYES: Eyes grossly normal to inspection, PERRL and conjunctivae and sclerae normal  HENT: ear canals and TM's normal, nose and mouth without ulcers or lesions  NECK: no adenopathy, no asymmetry, masses, or scars  RESP: lungs clear to auscultation - no rales, rhonchi or wheezes  CV: regular rate and rhythm, normal S1 S2, no S3 or S4, no murmur, click or rub, no peripheral edema  ABDOMEN: soft, nontender, no hepatosplenomegaly, no masses and bowel sounds normal  MS: no gross musculoskeletal defects noted, no edema  SKIN: no suspicious lesions or rashes  NEURO: Normal strength and tone, mentation intact and speech normal  PSYCH: mentation appears normal, affect normal/bright         5/29/2025   Mini Cog   Clock Draw Score 2 Normal   3 Item Recall 3 objects recalled   Mini Cog Total Score 5              Signed Electronically by: Vaishali Car MD

## 2025-05-29 NOTE — PATIENT INSTRUCTIONS
Leg cramps: make sure the soles of your shoes aren't too worn. Consider Epsom salt bath at night (magnesium salt), warm massage before bed (capseicin cream), taking magnesium can help.    Patient Education   Preventive Care Advice   This is general advice given by our system to help you stay healthy. However, your care team may have specific advice just for you. Please talk to your care team about your preventive care needs.  Nutrition  Eat 5 or more servings of fruits and vegetables each day.  Try wheat bread, brown rice and whole grain pasta (instead of white bread, rice, and pasta).  Get enough calcium and vitamin D. Check the label on foods and aim for 100% of the RDA (recommended daily allowance).  Lifestyle  Exercise at least 150 minutes each week  (30 minutes a day, 5 days a week).  Do muscle strengthening activities 2 days a week. These help control your weight and prevent disease.  No smoking.  Wear sunscreen to prevent skin cancer.  Have a dental exam and cleaning every 6 months.  Yearly exams  See your health care team every year to talk about:  Any changes in your health.  Any medicines your care team has prescribed.  Preventive care, family planning, and ways to prevent chronic diseases.  Shots (vaccines)   HPV shots (up to age 26), if you've never had them before.  Hepatitis B shots (up to age 59), if you've never had them before.  COVID-19 shot: Get this shot when it's due.  Flu shot: Get a flu shot every year.  Tetanus shot: Get a tetanus shot every 10 years.  Pneumococcal, hepatitis A, and RSV shots: Ask your care team if you need these based on your risk.  Shingles shot (for age 50 and up)  General health tests  Diabetes screening:  Starting at age 35, Get screened for diabetes at least every 3 years.  If you are younger than age 35, ask your care team if you should be screened for diabetes.  Cholesterol test: At age 39, start having a cholesterol test every 5 years, or more often if advised.  Bone  density scan (DEXA): At age 50, ask your care team if you should have this scan for osteoporosis (brittle bones).  Hepatitis C: Get tested at least once in your life.  STIs (sexually transmitted infections)  Before age 24: Ask your care team if you should be screened for STIs.  After age 24: Get screened for STIs if you're at risk. You are at risk for STIs (including HIV) if:  You are sexually active with more than one person.  You don't use condoms every time.  You or a partner was diagnosed with a sexually transmitted infection.  If you are at risk for HIV, ask about PrEP medicine to prevent HIV.  Get tested for HIV at least once in your life, whether you are at risk for HIV or not.  Cancer screening tests  Cervical cancer screening: If you have a cervix, begin getting regular cervical cancer screening tests starting at age 21.  Breast cancer scan (mammogram): If you've ever had breasts, begin having regular mammograms starting at age 40. This is a scan to check for breast cancer.  Colon cancer screening: It is important to start screening for colon cancer at age 45.  Have a colonoscopy test every 10 years (or more often if you're at risk) Or, ask your provider about stool tests like a FIT test every year or Cologuard test every 3 years.  To learn more about your testing options, visit:   .  For help making a decision, visit:   https://bit.ly/da91291.  Prostate cancer screening test: If you have a prostate, ask your care team if a prostate cancer screening test (PSA) at age 55 is right for you.  Lung cancer screening: If you are a current or former smoker ages 50 to 80, ask your care team if ongoing lung cancer screenings are right for you.  For informational purposes only. Not to replace the advice of your health care provider. Copyright   2023 [employer]. All rights reserved. Clinically reviewed by the [facility] Transitions Program. [employer] 397252 - REV 01/24.  Learning About  Stress  What is stress?     Stress is your body's response to a hard situation. Your body can have a physical, emotional, or mental response. Stress is a fact of life for most people, and it affects everyone differently. What causes stress for you may not be stressful for someone else.  A lot of things can cause stress. You may feel stress when you go on a job interview, take a test, or run a race. This kind of short-term stress is normal and even useful. It can help you if you need to work hard or react quickly. For example, stress can help you finish an important job on time.  Long-term stress is caused by ongoing stressful situations or events. Examples of long-term stress include long-term health problems, ongoing problems at work, or conflicts in your family. Long-term stress can harm your health.  How does stress affect your health?  When you are stressed, your body responds as though you are in danger. It makes hormones that speed up your heart, make you breathe faster, and give you a burst of energy. This is called the fight-or-flight stress response. If the stress is over quickly, your body goes back to normal and no harm is done.  But if stress happens too often or lasts too long, it can have bad effects. Long-term stress can make you more likely to get sick, and it can make symptoms of some diseases worse. If you tense up when you are stressed, you may develop neck, shoulder, or low back pain. Stress is linked to high blood pressure and heart disease.  Stress also harms your emotional health. It can make you gaxiola, tense, or depressed. Your relationships may suffer, and you may not do well at work or school.  What can you do to manage stress?  You can try these things to help manage stress:   Do something active. Exercise or activity can help reduce stress. Walking is a great way to get started. Even everyday activities such as housecleaning or yard work can help.  Try yoga or fallon chi. These techniques  combine exercise and meditation. You may need some training at first to learn them.  Do something you enjoy. For example, listen to music or go to a movie. Practice your hobby or do volunteer work.  Meditate. This can help you relax, because you are not worrying about what happened before or what may happen in the future.  Do guided imagery. Imagine yourself in any setting that helps you feel calm. You can use online videos, books, or a teacher to guide you.  Do breathing exercises. For example:  From a standing position, bend forward from the waist with your knees slightly bent. Let your arms dangle close to the floor.  Breathe in slowly and deeply as you return to a standing position. Roll up slowly and lift your head last.  Hold your breath for just a few seconds in the standing position.  Breathe out slowly and bend forward from the waist.  Let your feelings out. Talk, laugh, cry, and express anger when you need to. Talking with supportive friends or family, a counselor, or a serina leader about your feelings is a healthy way to relieve stress. Avoid discussing your feelings with people who make you feel worse.  Write. It may help to write about things that are bothering you. This helps you find out how much stress you feel and what is causing it. When you know this, you can find better ways to cope.  What can you do to prevent stress?  You might try some of these things to help prevent stress:  Manage your time. This helps you find time to do the things you want and need to do.  Get enough sleep. Your body recovers from the stresses of the day while you are sleeping.  Get support. Your family, friends, and community can make a difference in how you experience stress.  Limit your news feed. Avoid or limit time on social media or news that may make you feel stressed.  Do something active. Exercise or activity can help reduce stress. Walking is a great way to get started.  Where can you learn more?  Go to  "https://www.[employer].net/patiented  Enter N032 in the search box to learn more about \"Learning About Stress.\"  Current as of: October 24, 2024  Content Version: 14.4 2024-2025 [employer].   Care instructions adapted under license by your healthcare professional. If you have questions about a medical condition or this instruction, always ask your healthcare professional. [employer] disclaims any warranty or liability for your use of this information.       "

## 2025-05-30 ENCOUNTER — RESULTS FOLLOW-UP (OUTPATIENT)
Dept: FAMILY MEDICINE | Facility: CLINIC | Age: 75
End: 2025-05-30

## 2025-05-30 DIAGNOSIS — R73.01 IMPAIRED FASTING GLUCOSE: Primary | ICD-10-CM

## 2025-05-30 NOTE — RESULT ENCOUNTER NOTE
Thank you very much for getting labs done!   Your magnesium level is normal. You can still take this at night if it helps reduce leg cramps.  Your microalbumen is normal, which is great news. This means you do not have protein in the urine, and that your kidneys are currently functioning well. Keeping blood pressure and blood fats low is the best way to preserve your kidney function over your lifetime.   The testing of your kidney function, liver function and electrolytes was normal.     Your fasting glucose test was very slightly elevated.  A normal value is under 100.  We should do further investigation to see if you have diabetes.  Unfortunately, I could not add this test to the blood you had given already.      Please return to clinic anytime for a lab appointment.  You need a blood test called the HgAIC (hemoglobin A1c, or glycosylated hemoglobin) to see if you have diabetes.    You do NOT need to be fasting for this test.     If you have any questions, please contact the clinic or schedule an appointment with me, thank you!    Sincerely,  Dr. Vaishali Car MD  5/30/2025

## 2025-06-02 ENCOUNTER — MYC REFILL (OUTPATIENT)
Dept: FAMILY MEDICINE | Facility: CLINIC | Age: 75
End: 2025-06-02

## 2025-06-02 ENCOUNTER — LAB (OUTPATIENT)
Dept: LAB | Facility: CLINIC | Age: 75
End: 2025-06-02
Payer: COMMERCIAL

## 2025-06-02 DIAGNOSIS — E78.5 HYPERLIPIDEMIA LDL GOAL <130: ICD-10-CM

## 2025-06-02 DIAGNOSIS — R73.01 IMPAIRED FASTING GLUCOSE: ICD-10-CM

## 2025-06-02 LAB
EST. AVERAGE GLUCOSE BLD GHB EST-MCNC: 120 MG/DL
HBA1C MFR BLD: 5.8 % (ref 0–5.6)

## 2025-06-02 PROCEDURE — 83036 HEMOGLOBIN GLYCOSYLATED A1C: CPT

## 2025-06-02 PROCEDURE — 36415 COLL VENOUS BLD VENIPUNCTURE: CPT

## 2025-06-02 RX ORDER — ATORVASTATIN CALCIUM 40 MG/1
40 TABLET, FILM COATED ORAL DAILY
Qty: 90 TABLET | Refills: 2 | Status: SHIPPED | OUTPATIENT
Start: 2025-06-02

## 2025-06-03 ENCOUNTER — RESULTS FOLLOW-UP (OUTPATIENT)
Dept: FAMILY MEDICINE | Facility: CLINIC | Age: 75
End: 2025-06-03

## 2025-06-03 DIAGNOSIS — R73.01 IMPAIRED FASTING GLUCOSE: Primary | ICD-10-CM
